# Patient Record
Sex: MALE | Race: WHITE | NOT HISPANIC OR LATINO | Employment: OTHER | URBAN - METROPOLITAN AREA
[De-identification: names, ages, dates, MRNs, and addresses within clinical notes are randomized per-mention and may not be internally consistent; named-entity substitution may affect disease eponyms.]

---

## 2023-10-09 ENCOUNTER — TELEPHONE (OUTPATIENT)
Dept: PRIMARY CARE | Facility: CLINIC | Age: 78
End: 2023-10-09

## 2023-11-09 PROBLEM — B02.22 POST-HERPETIC TRIGEMINAL NEURALGIA: Status: ACTIVE | Noted: 2023-11-09

## 2023-11-09 PROBLEM — E78.5 HYPERLIPIDEMIA: Status: ACTIVE | Noted: 2023-11-09

## 2023-11-09 PROBLEM — E55.9 VITAMIN D DEFICIENCY: Status: ACTIVE | Noted: 2023-11-09

## 2023-11-09 PROBLEM — R93.89 ABNORMAL CT SCAN, CHEST: Status: ACTIVE | Noted: 2023-11-09

## 2023-11-09 PROBLEM — F17.200 CURRENT SMOKER: Status: ACTIVE | Noted: 2023-11-09

## 2023-11-09 PROBLEM — E66.3 OVERWEIGHT (BMI 25.0-29.9): Status: ACTIVE | Noted: 2023-11-09

## 2023-11-09 PROBLEM — I71.40 ANEURYSM OF ABDOMINAL AORTA (CMS-HCC): Status: ACTIVE | Noted: 2023-11-09

## 2023-11-09 PROBLEM — I65.29 CAROTID ARTERY STENOSIS, ASYMPTOMATIC: Status: ACTIVE | Noted: 2023-11-09

## 2023-11-09 PROBLEM — K40.30 INCARCERATED LEFT INGUINAL HERNIA: Status: ACTIVE | Noted: 2023-11-09

## 2023-11-09 PROBLEM — R73.9 HYPERGLYCEMIA: Status: ACTIVE | Noted: 2023-11-09

## 2023-11-09 PROBLEM — R09.89 BRUIT OF LEFT CAROTID ARTERY: Status: ACTIVE | Noted: 2023-11-09

## 2023-11-09 PROBLEM — R53.83 FATIGUE: Status: ACTIVE | Noted: 2023-11-09

## 2023-11-09 PROBLEM — N20.0 LEFT NEPHROLITHIASIS: Status: ACTIVE | Noted: 2023-11-09

## 2023-11-09 PROBLEM — I25.10 CAD (CORONARY ARTERY DISEASE): Status: ACTIVE | Noted: 2023-11-09

## 2023-11-09 PROBLEM — R59.0 HILAR ADENOPATHY: Status: ACTIVE | Noted: 2023-11-09

## 2023-11-09 PROBLEM — N40.0 BPH (BENIGN PROSTATIC HYPERPLASIA): Status: ACTIVE | Noted: 2023-11-09

## 2023-11-09 PROBLEM — I10 HTN (HYPERTENSION): Status: ACTIVE | Noted: 2023-11-09

## 2023-11-09 PROBLEM — K21.00 REFLUX ESOPHAGITIS: Status: ACTIVE | Noted: 2023-11-09

## 2023-11-09 PROBLEM — I49.1 PAC (PREMATURE ATRIAL CONTRACTION): Status: ACTIVE | Noted: 2023-11-09

## 2023-11-09 PROBLEM — I70.1 RIGHT RENAL ARTERY STENOSIS (CMS-HCC): Status: ACTIVE | Noted: 2023-11-09

## 2023-11-09 PROBLEM — K40.90 LEFT INGUINAL HERNIA: Status: ACTIVE | Noted: 2023-11-09

## 2023-11-09 PROBLEM — E79.0 HYPERURICEMIA: Status: ACTIVE | Noted: 2023-11-09

## 2023-11-09 PROBLEM — R93.1 ELEVATED CORONARY ARTERY CALCIUM SCORE: Status: ACTIVE | Noted: 2023-11-09

## 2023-11-09 RX ORDER — METOPROLOL SUCCINATE 25 MG/1
25 TABLET, EXTENDED RELEASE ORAL DAILY
COMMUNITY
Start: 2023-10-14 | End: 2024-03-04 | Stop reason: SDUPTHER

## 2023-11-09 RX ORDER — ALBUTEROL SULFATE 90 UG/1
2 AEROSOL, METERED RESPIRATORY (INHALATION) EVERY 6 HOURS
COMMUNITY
Start: 2022-01-14 | End: 2023-11-10 | Stop reason: ALTCHOICE

## 2023-11-09 RX ORDER — GABAPENTIN 100 MG/1
2 CAPSULE ORAL 3 TIMES DAILY PRN
COMMUNITY
Start: 2023-04-26 | End: 2023-11-10 | Stop reason: ALTCHOICE

## 2023-11-09 RX ORDER — ERGOCALCIFEROL 1.25 MG/1
1 CAPSULE ORAL
COMMUNITY

## 2023-11-09 RX ORDER — PREGABALIN 50 MG/1
50 CAPSULE ORAL 3 TIMES DAILY
COMMUNITY
Start: 2023-09-23

## 2023-11-09 RX ORDER — BENAZEPRIL HYDROCHLORIDE AND HYDROCHLOROTHIAZIDE 10; 12.5 MG/1; MG/1
1 TABLET ORAL DAILY
COMMUNITY
End: 2024-02-14 | Stop reason: SDUPTHER

## 2023-11-09 RX ORDER — ASPIRIN 81 MG/1
1 TABLET ORAL DAILY
COMMUNITY
End: 2023-11-10 | Stop reason: ALTCHOICE

## 2023-11-09 RX ORDER — TAMSULOSIN HYDROCHLORIDE 0.4 MG/1
1 CAPSULE ORAL DAILY
COMMUNITY
End: 2023-11-10 | Stop reason: ALTCHOICE

## 2023-11-09 RX ORDER — ALLOPURINOL 100 MG/1
100 TABLET ORAL DAILY
COMMUNITY

## 2023-11-09 RX ORDER — TRAMADOL HYDROCHLORIDE 50 MG/1
50 TABLET ORAL EVERY 4 HOURS PRN
COMMUNITY
Start: 2023-07-08 | End: 2024-05-15 | Stop reason: ALTCHOICE

## 2023-11-09 RX ORDER — VALACYCLOVIR HYDROCHLORIDE 1 G/1
TABLET, FILM COATED ORAL
COMMUNITY
Start: 2023-04-12 | End: 2023-11-10 | Stop reason: ALTCHOICE

## 2023-11-09 RX ORDER — TRAZODONE HYDROCHLORIDE 50 MG/1
50 TABLET ORAL NIGHTLY PRN
COMMUNITY
Start: 2023-09-30 | End: 2024-05-15 | Stop reason: ALTCHOICE

## 2023-11-10 ENCOUNTER — OFFICE VISIT (OUTPATIENT)
Dept: CARDIOLOGY | Facility: CLINIC | Age: 78
End: 2023-11-10
Payer: MEDICARE

## 2023-11-10 VITALS
HEART RATE: 64 BPM | HEIGHT: 70 IN | TEMPERATURE: 97.9 F | DIASTOLIC BLOOD PRESSURE: 66 MMHG | SYSTOLIC BLOOD PRESSURE: 100 MMHG | WEIGHT: 197 LBS | BODY MASS INDEX: 28.2 KG/M2

## 2023-11-10 DIAGNOSIS — I48.3 TYPICAL ATRIAL FLUTTER (MULTI): Primary | ICD-10-CM

## 2023-11-10 PROCEDURE — 3078F DIAST BP <80 MM HG: CPT | Performed by: INTERNAL MEDICINE

## 2023-11-10 PROCEDURE — 3074F SYST BP LT 130 MM HG: CPT | Performed by: INTERNAL MEDICINE

## 2023-11-10 PROCEDURE — 1126F AMNT PAIN NOTED NONE PRSNT: CPT | Performed by: INTERNAL MEDICINE

## 2023-11-10 PROCEDURE — 99213 OFFICE O/P EST LOW 20 MIN: CPT | Performed by: INTERNAL MEDICINE

## 2023-11-10 RX ORDER — ACETAMINOPHEN 325 MG/1
325 TABLET ORAL EVERY 6 HOURS PRN
COMMUNITY
Start: 2022-11-29 | End: 2024-02-14 | Stop reason: ALTCHOICE

## 2023-11-10 RX ORDER — ESOMEPRAZOLE MAGNESIUM 40 MG/1
40 CAPSULE, DELAYED RELEASE ORAL
COMMUNITY
Start: 2022-11-29 | End: 2024-03-28 | Stop reason: ALTCHOICE

## 2023-11-10 RX ORDER — ACETYLCYSTEINE 600 MG
600 CAPSULE ORAL
COMMUNITY
Start: 2021-02-16 | End: 2024-02-14 | Stop reason: ALTCHOICE

## 2023-11-10 ASSESSMENT — PAIN SCALES - GENERAL: PAINLEVEL: 0-NO PAIN

## 2023-11-10 NOTE — PROGRESS NOTES
Subjective:  The patient is a 78-year-old white male, followed primarily by Dr. Gael Lopez, who presents for follow-up of atrial arrhythmias.  He resides in [place], [place], and presents today with his friend named Andria.  He has been a  for 9 years.    The patient has a history of hypertension and hyperlipidemia, and underwent percutaneous abdominal aortic aneurysm repair in November 2022.  Preoperatively, he had a negative nuclear stress test, showing an LVEF of 62% and no reversible ischemia.  He was noted to have frequent atrial ectopy with a PAC burden of 32.7% by 24-hour Holter monitoring in December 2022.  I saw the patient thereafter, and started him on low-dose beta-blockers for PAC suppression.    The patient was seen at [facility] on 10/25/2023 because of some vague chest heaviness.  His work-up in the ER was negative, but an EKG showed classic atrial flutter with a ventricular response only in the 60s.  The patient admits to generalized weakness and fatigue but no near-syncope or syncope.  He has never had any transient ischemic attacks or strokes.    The patient has been a smoker for the past 60 years and presumed to have some degree of chronic obstructive pulmonary disease.  He also has known mild carotid artery disease by prior ultrasonography.    Current Outpatient Medications   Medication Sig    acetaminophen (Tylenol) 325 mg tablet Take 1 tablet (325 mg) by mouth every 6 hours if needed.    acetylcysteine 600 mg capsule capsule Take 1 capsule (600 mg) by mouth once daily.    esomeprazole (NexIUM) 40 mg DR capsule Take 1 capsule (40 mg) by mouth once daily in the morning. Take before meals.    allopurinol (Zyloprim) 100 mg tablet Take 1 tablet (100 mg) by mouth once daily.    benazepriL-hydrochlorothiazide (Lotensin HCT) 10-12.5 mg tablet Take 1 tablet by mouth once daily.    ergocalciferol (Vitamin D-2) 1.25 MG (63361 UT) capsule Take 1 capsule (1,250 mcg) by mouth 1 (one) time  "per week.    metoprolol succinate XL (Toprol-XL) 25 mg 24 hr tablet Take 1 tablet (25 mg) by mouth once daily.    mv-mn/om3/dha/epa/fish/lut/moise (OCUVITE ADULT 50 PLUS ORAL) Take 1 capsule by mouth 2 times a day. one capsule in the morning and one capsule in the evening    pregabalin (Lyrica) 50 mg capsule Take 1 capsule (50 mg) by mouth 3 times a day.    simvastatin (Zocor) 40 mg tablet TAKE 1 TABLET BY MOUTH AT BEDTIME    traMADol (Ultram) 50 mg tablet Take 1 tablet (50 mg) by mouth every 4 hours if needed for moderate pain (4 - 6).    traZODone (Desyrel) 50 mg tablet Take 1 tablet (50 mg) by mouth as needed at bedtime for sleep.     Allergies:  Patient has no known allergies.     Patient Active Problem List   Diagnosis    Abnormal CT scan, chest    Aneurysm of abdominal aorta (CMS/HCC)    BPH (benign prostatic hyperplasia)    CAD (coronary artery disease)    Elevated coronary artery calcium score    Carotid artery stenosis, asymptomatic    Current smoker    Fatigue    Hilar adenopathy    HTN (hypertension)    Hyperglycemia    Hyperlipidemia    Hyperuricemia    Incarcerated left inguinal hernia    Left inguinal hernia    Left nephrolithiasis    Overweight (BMI 25.0-29.9)    Reflux esophagitis    Right renal artery stenosis (CMS/HCC)    Vitamin D deficiency    Bruit of left carotid artery    PAC (premature atrial contraction)    Post-herpetic trigeminal neuralgia     Past Surgical History:   Procedure Laterality Date    OTHER SURGICAL HISTORY  02/07/2022    Colonoscopy    OTHER SURGICAL HISTORY  09/02/2022    Prostate surgery     Objective:  Vitals:    11/10/23 1044   BP: 100/66   Pulse: 64   Temp: 36.6 °C (97.9 °F)   Height:     1.778 m (5' 10\")  Weight: 89.4 kg (197 lb)     Exam:  Gen: Bearded gentleman in no distress, accompanied by his friend Andria.  HEENT: No scleral icterus; mild rhinophyma with facial rubor.  Neck: No jugular venous distention or thyromegaly.  Lungs: Diminished breath sounds throughout, " but no wheezes or rales  Heart: Irregular rhythm with variable S1 but no murmurs.  Abdomen: Benign, with no organomegaly or masses.  Extremities: Intact distal pulses; trace bilateral ankle edema.  Neuro: No focal neurologic abnormalities.  Skin: No cutaneous lesions.    Impressions:  1.  Chronic hypertension, under good control.  2.  Longstanding tobacco use with presumed chronic obstructive pulmonary disease.  3.  Atherosclerotic vascular disease, status post abdominal aortic aneurysm repair in November 2022.  4.  Atrial arrhythmias, with prior frequent premature atrial complexes but now in atrial flutter of uncertain duration.  The etiology of this is likely the patient's lung disease.  The patient appears to have good rate control low-dose beta-blockers.  He has a [id number] score of at least 4 (hypertension, vascular disease, and 2 points for age over 75), and full anticoagulation is warranted.  5.  Hyperlipidemia, on statin therapy.    Recommendations:  1.  The patient will be started on Eliquis anticoagulation of 5 mg p.o. twice daily.  2.  He will undergo a direct-current cardioversion at [facility] after 1 month of anticoagulant therapy.  3.  With recurrence of classic atrial flutter thereafter, he would be an excellent candidate for cavotricuspid isthmus ablation, something I discussed with today with him and his friend Andria.      Nikunj Rios MD

## 2023-11-15 ENCOUNTER — TELEPHONE (OUTPATIENT)
Dept: CARDIOLOGY | Facility: CLINIC | Age: 78
End: 2023-11-15
Payer: MEDICARE

## 2023-11-15 NOTE — TELEPHONE ENCOUNTER
Patient is trying to scheduled cardio version at [facility] if possible. Saw Mosteller in office Friday. Please call [phone]

## 2023-12-01 ENCOUNTER — APPOINTMENT (OUTPATIENT)
Dept: CARDIOLOGY | Facility: HOSPITAL | Age: 78
End: 2023-12-01
Payer: MEDICARE

## 2023-12-07 LAB
NON-UH HIE BASO COUNT: 0.05 X1000 (ref 0–0.2)
NON-UH HIE BASOS %: 1 %
NON-UH HIE BUN/CREAT RATIO: 15
NON-UH HIE BUN: 18 MG/DL (ref 9–23)
NON-UH HIE CALCIUM: 9.1 MG/DL (ref 8.7–10.4)
NON-UH HIE CALCULATED OSMOLALITY: 282 MOSM/KG (ref 275–295)
NON-UH HIE CHLORIDE: 106 MMOL/L (ref 98–107)
NON-UH HIE CO2, VENOUS: 30 MMOL/L (ref 20–31)
NON-UH HIE CREATININE: 1.2 MG/DL (ref 0.6–1.1)
NON-UH HIE DIFF?: NO
NON-UH HIE EOS COUNT: 0.17 X1000 (ref 0–0.5)
NON-UH HIE EOSIN %: 3.4 %
NON-UH HIE GFR AA: >60
NON-UH HIE GLOMERULAR FILTRATION RATE: 58 ML/MIN/1.73M?
NON-UH HIE GLUCOSE: 110 MG/DL (ref 74–106)
NON-UH HIE HCT: 40.2 % (ref 41–52)
NON-UH HIE HGB: 13.8 G/DL (ref 13.5–17.5)
NON-UH HIE INSTR WBC: 5
NON-UH HIE K: 4.4 MMOL/L (ref 3.5–5.1)
NON-UH HIE LYMPH %: 20.4 %
NON-UH HIE LYMPH COUNT: 1.02 X1000 (ref 1.2–4.8)
NON-UH HIE MCH: 33.2 PG (ref 27–34)
NON-UH HIE MCHC: 34.3 G/DL (ref 32–37)
NON-UH HIE MCV: 96.7 FL (ref 80–100)
NON-UH HIE MONO %: 10.6 %
NON-UH HIE MONO COUNT: 0.53 X1000 (ref 0.1–1)
NON-UH HIE MPV: 8.1 FL (ref 7.4–10.4)
NON-UH HIE NA: 140 MMOL/L (ref 135–145)
NON-UH HIE NEUTROPHIL %: 64.6 %
NON-UH HIE NEUTROPHIL COUNT (ANC): 3.22 X1000 (ref 1.4–8.8)
NON-UH HIE NUCLEATED RBC: 0 /100WBC
NON-UH HIE PLATELET: 160 X10 (ref 150–450)
NON-UH HIE RBC: 4.16 X10 (ref 4.7–6.1)
NON-UH HIE RDW: 13.9 % (ref 11.5–14.5)
NON-UH HIE WBC: 5 X10 (ref 4.5–11)

## 2023-12-27 ENCOUNTER — OFFICE VISIT (OUTPATIENT)
Dept: CARDIOLOGY | Facility: CLINIC | Age: 78
End: 2023-12-27
Payer: MEDICARE

## 2023-12-27 VITALS
WEIGHT: 198 LBS | HEART RATE: 72 BPM | SYSTOLIC BLOOD PRESSURE: 118 MMHG | DIASTOLIC BLOOD PRESSURE: 74 MMHG | HEIGHT: 70 IN | BODY MASS INDEX: 28.35 KG/M2

## 2023-12-27 DIAGNOSIS — I71.43 INFRARENAL ABDOMINAL AORTIC ANEURYSM (AAA) WITHOUT RUPTURE (CMS-HCC): Primary | ICD-10-CM

## 2023-12-27 DIAGNOSIS — I48.3 TYPICAL ATRIAL FLUTTER (MULTI): ICD-10-CM

## 2023-12-27 DIAGNOSIS — I10 PRIMARY HYPERTENSION: ICD-10-CM

## 2023-12-27 PROCEDURE — 93000 ELECTROCARDIOGRAM COMPLETE: CPT | Performed by: INTERNAL MEDICINE

## 2023-12-27 PROCEDURE — 1159F MED LIST DOCD IN RCRD: CPT | Performed by: INTERNAL MEDICINE

## 2023-12-27 PROCEDURE — 3074F SYST BP LT 130 MM HG: CPT | Performed by: INTERNAL MEDICINE

## 2023-12-27 PROCEDURE — 3078F DIAST BP <80 MM HG: CPT | Performed by: INTERNAL MEDICINE

## 2023-12-27 PROCEDURE — 1126F AMNT PAIN NOTED NONE PRSNT: CPT | Performed by: INTERNAL MEDICINE

## 2023-12-27 PROCEDURE — 99214 OFFICE O/P EST MOD 30 MIN: CPT | Performed by: INTERNAL MEDICINE

## 2023-12-27 NOTE — PROGRESS NOTES
1. CAD, high calcium score  2. Active smoker, emphysema  3. Hypertension  4. Hyperlipidemia  5. AAA percutaneous intravascular stenting 11/2022  6. Overweight mild    Patient is a pleasant 78-year-old male with the above-noted pertinent past medical history who presents today for follow-up.  November 10, 2023 he underwent elective cardioversion for atrial flutter, he presents today stating that he is level of energy has improved his wife is present at the visit time who concurs with the fact that he has more energy,, patient has no complaints of palpitation he has been compliant with his medication, he continues to be an active smoker yet he states that he has been able to cut back some he denies orthopnea PND or lower extremity edema.    Vital signs reviewed and stable BMI of 28.4  Patient is a well-developed well-nourished male in no acute distress speaking full sentences no carotid bruits upstroke and volumes and central venous pressures are normal, heart rate and rhythm are regular S1-S2 are distant sound but normal intensity no gallop or murmurs appreciated lungs severely decreased breath sound but clear abdomen is moderately distended positive bowel sounds soft and nontender    Echocardiography November 23 showed that his LV systolic function is normal at 60+/- 5% ascending aorta mildly aneurysmal at 4.1 cm  EKG today shows sinus bradycardia with first-degree AV block GA interval 556 ms otherwise normal active problems    Clinical atrial flutter with elective cardioversion after completing 3 weeks of anticoagulation patient was recommended to continue with his Eliquis at this time, I had received a phone call from Dr. Rios who had stated that after receiving sedating medication for his cardioversion patient has a very loud snoring recommend patient to undergo home sleep study  Heart healthy diet and weight loss was recommended  Smoking cessation was extensively discussed recommendation for complete  cessation was made  Patient is to return to my clinic in 3 months for reevaluation

## 2024-02-07 PROBLEM — Z95.828 HISTORY OF ENDOVASCULAR STENT GRAFT FOR ABDOMINAL AORTIC ANEURYSM (AAA): Status: ACTIVE | Noted: 2023-07-12

## 2024-02-07 PROBLEM — K44.9 HIATAL HERNIA: Status: ACTIVE | Noted: 2024-02-07

## 2024-02-07 PROBLEM — I48.92 ATRIAL FLUTTER (MULTI): Status: ACTIVE | Noted: 2023-12-07

## 2024-02-14 ENCOUNTER — OFFICE VISIT (OUTPATIENT)
Dept: PRIMARY CARE | Facility: CLINIC | Age: 79
End: 2024-02-14
Payer: MEDICARE

## 2024-02-14 VITALS
OXYGEN SATURATION: 93 % | BODY MASS INDEX: 28.2 KG/M2 | WEIGHT: 197 LBS | SYSTOLIC BLOOD PRESSURE: 130 MMHG | HEIGHT: 70 IN | HEART RATE: 58 BPM | TEMPERATURE: 97.7 F | DIASTOLIC BLOOD PRESSURE: 90 MMHG

## 2024-02-14 DIAGNOSIS — F17.200 ENCOUNTER FOR SCREENING FOR MALIGNANT NEOPLASM OF LUNG IN CURRENT SMOKER WITH 30 PACK YEAR HISTORY OR GREATER: ICD-10-CM

## 2024-02-14 DIAGNOSIS — E78.2 MIXED HYPERLIPIDEMIA: ICD-10-CM

## 2024-02-14 DIAGNOSIS — R73.9 HYPERGLYCEMIA: ICD-10-CM

## 2024-02-14 DIAGNOSIS — I25.10 CORONARY ARTERY DISEASE INVOLVING NATIVE CORONARY ARTERY OF NATIVE HEART WITHOUT ANGINA PECTORIS: ICD-10-CM

## 2024-02-14 DIAGNOSIS — E55.9 VITAMIN D DEFICIENCY: ICD-10-CM

## 2024-02-14 DIAGNOSIS — Z53.20 LUNG CANCER SCREENING DECLINED BY PATIENT: ICD-10-CM

## 2024-02-14 DIAGNOSIS — I10 PRIMARY HYPERTENSION: ICD-10-CM

## 2024-02-14 DIAGNOSIS — F17.200 CURRENT SMOKER: ICD-10-CM

## 2024-02-14 DIAGNOSIS — Z00.00 WELLNESS EXAMINATION: Primary | ICD-10-CM

## 2024-02-14 DIAGNOSIS — N28.9 ABNORMAL RENAL FUNCTION: ICD-10-CM

## 2024-02-14 DIAGNOSIS — Z12.2 ENCOUNTER FOR SCREENING FOR MALIGNANT NEOPLASM OF LUNG IN CURRENT SMOKER WITH 30 PACK YEAR HISTORY OR GREATER: ICD-10-CM

## 2024-02-14 DIAGNOSIS — Z86.79 HISTORY OF ATRIAL FLUTTER: ICD-10-CM

## 2024-02-14 DIAGNOSIS — E78.5 HYPERLIPIDEMIA, UNSPECIFIED: ICD-10-CM

## 2024-02-14 PROCEDURE — 3080F DIAST BP >= 90 MM HG: CPT | Performed by: INTERNAL MEDICINE

## 2024-02-14 PROCEDURE — 3075F SYST BP GE 130 - 139MM HG: CPT | Performed by: INTERNAL MEDICINE

## 2024-02-14 PROCEDURE — 99397 PER PM REEVAL EST PAT 65+ YR: CPT | Performed by: INTERNAL MEDICINE

## 2024-02-14 PROCEDURE — 1126F AMNT PAIN NOTED NONE PRSNT: CPT | Performed by: INTERNAL MEDICINE

## 2024-02-14 PROCEDURE — 1170F FXNL STATUS ASSESSED: CPT | Performed by: INTERNAL MEDICINE

## 2024-02-14 RX ORDER — SIMVASTATIN 40 MG/1
40 TABLET, FILM COATED ORAL NIGHTLY
Qty: 90 TABLET | Refills: 1 | Status: SHIPPED | OUTPATIENT
Start: 2024-02-14

## 2024-02-14 RX ORDER — BENAZEPRIL HYDROCHLORIDE AND HYDROCHLOROTHIAZIDE 10; 12.5 MG/1; MG/1
1 TABLET ORAL DAILY
Qty: 90 TABLET | Refills: 1 | Status: SHIPPED | OUTPATIENT
Start: 2024-02-14

## 2024-02-14 ASSESSMENT — ACTIVITIES OF DAILY LIVING (ADL)
MANAGING_FINANCES: INDEPENDENT
DRESSING: INDEPENDENT
GROCERY_SHOPPING: INDEPENDENT
DOING_HOUSEWORK: INDEPENDENT
TAKING_MEDICATION: INDEPENDENT
BATHING: INDEPENDENT

## 2024-02-14 ASSESSMENT — PATIENT HEALTH QUESTIONNAIRE - PHQ9
SUM OF ALL RESPONSES TO PHQ9 QUESTIONS 1 AND 2: 0
1. LITTLE INTEREST OR PLEASURE IN DOING THINGS: NOT AT ALL
2. FEELING DOWN, DEPRESSED OR HOPELESS: NOT AT ALL

## 2024-02-14 ASSESSMENT — ENCOUNTER SYMPTOMS
LOSS OF SENSATION IN FEET: 0
DEPRESSION: 0
OCCASIONAL FEELINGS OF UNSTEADINESS: 0

## 2024-02-14 NOTE — PROGRESS NOTES
Patient is seen office today for wellness examination and follow-up of his medical problems including hypertension, hyperlipidemia, hyperglycemia, hyperuricemia and other medical problems.  He is being followed by his cardiologist for history of atrial flutter.  He denies any chest pain or palpitation.  Has no cough expectoration.  Denies any nausea matting pain abdomen.  He has no urinary symptoms.  Denies any weakness of any extremity.  Denies any joint swelling or pain he did not have any attack of gout recently.  He denies any polyuria, polydipsia any other symptom uncontrolled hyperglycemia.  Unfortunately continues to smoke a pack a day.  He is still declines any screening for the lung cancer.    On examination:  General examination: Overweight.  BMI 28.2  Eyes: There is no pallor or jaundice.  Pupils are equal and reactive to light  Oral cavity throat normal  Neck: There is no carotid bruit or JVD  Lungs: Clear to auscultation  CVS: Heart sounds are regular.  There is no gallop or murmur  Abdomen: Normal exam  CNS: Power is normal  Musculoskeletal system there is no joint swelling or deformity  Legs: No edema    Assessment and plan  1.  History of smoking more than 30-pack-year: Smoking cessation discussed with the patient.  Patient does not want any cancer screening to be done.  Different treatment options are also discussed with the patient  2.  Hypertension: Controlled today's borderline high but generally under good control.  His prescription for benazepril and hydrochlorothiazide is renewed  3.  Hyperlipidemia: I will refill the prescription for simvastatin.  Repeat lipid panel is ordered  4.  Hyperglycemia: Advised to continue to avoid sugar and sweets in the diet repeat hemoglobin A1c level is ordered  5.  Hyperuricemia continued on allopurinol.  Uric acid level is ordered  6.  History of atrial flutter: Patient seems to be in sinus rhythm at this time.  He is on Eliquis he is being followed by his  cardiologist  7.  Gastroesophageal reflux disease: Symptoms are controlled on Nexium  8.  Health maintenance: He had a colonoscopy done in 2020.  He is goes to urologist Dr. Peguero for prostate checkup.  He already had a flu vaccination.  He does not want an updated COVID-vaccine.  He will be seen in office in 4 months.

## 2024-02-17 ENCOUNTER — LAB (OUTPATIENT)
Dept: LAB | Facility: LAB | Age: 79
End: 2024-02-17
Payer: MEDICARE

## 2024-02-17 DIAGNOSIS — N28.9 ABNORMAL RENAL FUNCTION: ICD-10-CM

## 2024-02-17 DIAGNOSIS — R73.9 HYPERGLYCEMIA: ICD-10-CM

## 2024-02-17 DIAGNOSIS — I10 PRIMARY HYPERTENSION: ICD-10-CM

## 2024-02-17 DIAGNOSIS — E78.2 MIXED HYPERLIPIDEMIA: ICD-10-CM

## 2024-02-17 DIAGNOSIS — Z86.79 HISTORY OF ATRIAL FLUTTER: ICD-10-CM

## 2024-02-17 PROCEDURE — 36415 COLL VENOUS BLD VENIPUNCTURE: CPT

## 2024-02-17 PROCEDURE — 83036 HEMOGLOBIN GLYCOSYLATED A1C: CPT

## 2024-02-17 PROCEDURE — 84550 ASSAY OF BLOOD/URIC ACID: CPT

## 2024-02-17 PROCEDURE — 80053 COMPREHEN METABOLIC PANEL: CPT

## 2024-02-17 PROCEDURE — 80061 LIPID PANEL: CPT

## 2024-02-17 PROCEDURE — 85025 COMPLETE CBC W/AUTO DIFF WBC: CPT

## 2024-02-18 LAB
ALBUMIN SERPL BCP-MCNC: 4.3 G/DL (ref 3.4–5)
ALP SERPL-CCNC: 60 U/L (ref 33–136)
ALT SERPL W P-5'-P-CCNC: 12 U/L (ref 10–52)
ANION GAP SERPL CALC-SCNC: 13 MMOL/L (ref 10–20)
AST SERPL W P-5'-P-CCNC: 14 U/L (ref 9–39)
BASOPHILS # BLD AUTO: 0.04 X10*3/UL (ref 0–0.1)
BASOPHILS NFR BLD AUTO: 0.5 %
BILIRUB SERPL-MCNC: 0.7 MG/DL (ref 0–1.2)
BUN SERPL-MCNC: 16 MG/DL (ref 6–23)
CALCIUM SERPL-MCNC: 9.4 MG/DL (ref 8.6–10.6)
CHLORIDE SERPL-SCNC: 104 MMOL/L (ref 98–107)
CHOLEST SERPL-MCNC: 150 MG/DL (ref 0–199)
CHOLESTEROL/HDL RATIO: 2.7
CO2 SERPL-SCNC: 28 MMOL/L (ref 21–32)
CREAT SERPL-MCNC: 1.12 MG/DL (ref 0.5–1.3)
EGFRCR SERPLBLD CKD-EPI 2021: 67 ML/MIN/1.73M*2
EOSINOPHIL # BLD AUTO: 0.15 X10*3/UL (ref 0–0.4)
EOSINOPHIL NFR BLD AUTO: 1.9 %
ERYTHROCYTE [DISTWIDTH] IN BLOOD BY AUTOMATED COUNT: 13.2 % (ref 11.5–14.5)
EST. AVERAGE GLUCOSE BLD GHB EST-MCNC: 123 MG/DL
GLUCOSE SERPL-MCNC: 100 MG/DL (ref 74–99)
HBA1C MFR BLD: 5.9 %
HCT VFR BLD AUTO: 43.5 % (ref 41–52)
HDLC SERPL-MCNC: 55.2 MG/DL
HGB BLD-MCNC: 13.9 G/DL (ref 13.5–17.5)
IMM GRANULOCYTES # BLD AUTO: 0.02 X10*3/UL (ref 0–0.5)
IMM GRANULOCYTES NFR BLD AUTO: 0.3 % (ref 0–0.9)
LDLC SERPL CALC-MCNC: 77 MG/DL
LYMPHOCYTES # BLD AUTO: 0.99 X10*3/UL (ref 0.8–3)
LYMPHOCYTES NFR BLD AUTO: 12.5 %
MCH RBC QN AUTO: 32 PG (ref 26–34)
MCHC RBC AUTO-ENTMCNC: 32 G/DL (ref 32–36)
MCV RBC AUTO: 100 FL (ref 80–100)
MONOCYTES # BLD AUTO: 0.58 X10*3/UL (ref 0.05–0.8)
MONOCYTES NFR BLD AUTO: 7.3 %
NEUTROPHILS # BLD AUTO: 6.16 X10*3/UL (ref 1.6–5.5)
NEUTROPHILS NFR BLD AUTO: 77.5 %
NON HDL CHOLESTEROL: 95 MG/DL (ref 0–149)
NRBC BLD-RTO: 0 /100 WBCS (ref 0–0)
PLATELET # BLD AUTO: 196 X10*3/UL (ref 150–450)
POTASSIUM SERPL-SCNC: 4.4 MMOL/L (ref 3.5–5.3)
PROT SERPL-MCNC: 6.4 G/DL (ref 6.4–8.2)
RBC # BLD AUTO: 4.34 X10*6/UL (ref 4.5–5.9)
SODIUM SERPL-SCNC: 141 MMOL/L (ref 136–145)
TRIGL SERPL-MCNC: 90 MG/DL (ref 0–149)
URATE SERPL-MCNC: 4.9 MG/DL (ref 4–7.5)
VLDL: 18 MG/DL (ref 0–40)
WBC # BLD AUTO: 7.9 X10*3/UL (ref 4.4–11.3)

## 2024-02-19 ENCOUNTER — TELEPHONE (OUTPATIENT)
Dept: PRIMARY CARE | Facility: CLINIC | Age: 79
End: 2024-02-19
Payer: MEDICARE

## 2024-02-19 DIAGNOSIS — N17.9 AKI (ACUTE KIDNEY INJURY) (CMS-HCC): Primary | ICD-10-CM

## 2024-02-19 NOTE — TELEPHONE ENCOUNTER
Pt is aware of results.     ----- Message from Tony Lopez MD sent at 2/19/2024 10:48 AM EST -----  Abnormal renal function.  Repeat BMP in 6 to 8-week.  Advised the patient to drink plenty of water keep well-hydrated.  Other results are okay  ----- Message -----  From: Lab, Background User  Sent: 2/18/2024   3:51 AM EST  To: Toyn Lopze MD

## 2024-03-04 DIAGNOSIS — I49.1 PAC (PREMATURE ATRIAL CONTRACTION): ICD-10-CM

## 2024-03-05 RX ORDER — METOPROLOL SUCCINATE 25 MG/1
25 TABLET, EXTENDED RELEASE ORAL DAILY
Qty: 90 TABLET | Refills: 2 | Status: SHIPPED | OUTPATIENT
Start: 2024-03-05

## 2024-03-07 NOTE — TELEPHONE ENCOUNTER
Andria called and was questioning why the Metoprolol is taking so long to order. Would like to speak with a manager. Per chart, medication was sent 3/5 to [facility]

## 2024-03-28 ENCOUNTER — APPOINTMENT (OUTPATIENT)
Dept: PRIMARY CARE | Facility: CLINIC | Age: 79
End: 2024-03-28
Payer: MEDICARE

## 2024-03-28 ENCOUNTER — OFFICE VISIT (OUTPATIENT)
Dept: CARDIOLOGY | Facility: CLINIC | Age: 79
End: 2024-03-28
Payer: MEDICARE

## 2024-03-28 ENCOUNTER — APPOINTMENT (OUTPATIENT)
Dept: CARDIOLOGY | Facility: CLINIC | Age: 79
End: 2024-03-28
Payer: MEDICARE

## 2024-03-28 VITALS
SYSTOLIC BLOOD PRESSURE: 124 MMHG | WEIGHT: 196 LBS | DIASTOLIC BLOOD PRESSURE: 76 MMHG | BODY MASS INDEX: 27.44 KG/M2 | HEIGHT: 71 IN | TEMPERATURE: 96.4 F | HEART RATE: 60 BPM

## 2024-03-28 DIAGNOSIS — R93.1 ELEVATED CORONARY ARTERY CALCIUM SCORE: ICD-10-CM

## 2024-03-28 DIAGNOSIS — I10 PRIMARY HYPERTENSION: ICD-10-CM

## 2024-03-28 DIAGNOSIS — E78.2 MIXED HYPERLIPIDEMIA: ICD-10-CM

## 2024-03-28 DIAGNOSIS — I71.43 INFRARENAL ABDOMINAL AORTIC ANEURYSM (AAA) WITHOUT RUPTURE (CMS-HCC): ICD-10-CM

## 2024-03-28 DIAGNOSIS — I25.10 CORONARY ARTERY DISEASE INVOLVING NATIVE CORONARY ARTERY OF NATIVE HEART WITHOUT ANGINA PECTORIS: Primary | ICD-10-CM

## 2024-03-28 PROCEDURE — 99214 OFFICE O/P EST MOD 30 MIN: CPT | Performed by: INTERNAL MEDICINE

## 2024-03-28 PROCEDURE — 3074F SYST BP LT 130 MM HG: CPT | Performed by: INTERNAL MEDICINE

## 2024-03-28 PROCEDURE — 1126F AMNT PAIN NOTED NONE PRSNT: CPT | Performed by: INTERNAL MEDICINE

## 2024-03-28 PROCEDURE — 3078F DIAST BP <80 MM HG: CPT | Performed by: INTERNAL MEDICINE

## 2024-03-28 RX ORDER — LUTEIN 10 MG
10 TABLET ORAL
COMMUNITY
Start: 2018-08-16

## 2024-03-28 RX ORDER — FINASTERIDE 5 MG/1
5 TABLET, FILM COATED ORAL DAILY
COMMUNITY
Start: 2018-08-16 | End: 2024-05-15 | Stop reason: ALTCHOICE

## 2024-03-28 ASSESSMENT — PAIN SCALES - GENERAL: PAINLEVEL: 0-NO PAIN

## 2024-03-28 NOTE — PROGRESS NOTES
1. CAD, high calcium score  2. Active smoker, emphysema  3. Hypertension  4. Hyperlipidemia  5. AAA percutaneous intravascular stenting 11/2022  6. Overweight mild    Patient is a pleasant 78-year-old male with the above noted pertinent past medical history who presents today for follow-up.  He has no complaints of exertional chest pain or shortness of breath, he has no scheduled exercises yet remains active like chores around the house, he continues to be in a active smoker of 1 pack/day, and he says he is not been compliant with his diet and he states that he enjoys his sweets with daily consumption cookies cakes and.  He has no complaints of orthopnea PND palpitation or syncopal episode    BMI is elevated at 27.3, blood pressure 124/76 mmHg and a heart rate of 60 bpm patient is a well-developed well-nourished pleasant gentleman no acute distress speaking full sentences no carotid bruits upstroke and volumes are normal heart rate and rhythm are regular S1-S2 are normal no gallop or murmurs, lungs are clear to auscultation abdomen is obese positive bowel sounds soft and nontender    February 2024  Total cholesterol 150, triglyceride 90, HDL 55, and LDL of 77  Sodium 141, potassium 4.4, BUN 16, creatinine 1.12  A1c 5.9%    Coronary artery disease with coronary artery calcification as evident by CT examination the importance of risk factor modification was discussed continuation of current regimen was recommended  Atrial fibrillation paroxysmal on rate control anticoagulation  Hypertension under good control  Hyperlipidemia his latest cholesterol profile was reviewed and discussed with him  After the smoking complete smoking cessation was discussed and recommended  Noncompliant with dietary restrictions decreasing carbohydrate use cookies cakes and pies was discussed  Return to my clinic in 6 months.

## 2024-05-13 ENCOUNTER — PATIENT OUTREACH (OUTPATIENT)
Dept: PRIMARY CARE | Facility: CLINIC | Age: 79
End: 2024-05-13
Payer: MEDICARE

## 2024-05-14 PROBLEM — R51.9 HEADACHE: Status: ACTIVE | Noted: 2024-05-14

## 2024-05-14 PROBLEM — R05.9 COUGH: Status: ACTIVE | Noted: 2024-05-14

## 2024-05-14 PROBLEM — R29.898 LEG WEAKNESS: Status: ACTIVE | Noted: 2024-04-23

## 2024-05-14 PROBLEM — I25.10 ARTERIOSCLEROSIS OF CORONARY ARTERY: Status: ACTIVE | Noted: 2023-11-09

## 2024-05-14 PROBLEM — I71.40 ABDOMINAL AORTIC ANEURYSM (AAA) WITHOUT RUPTURE (CMS-HCC): Status: ACTIVE | Noted: 2018-12-26
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
## 2024-05-14 PROBLEM — L72.3 SEBACEOUS CYST: Status: ACTIVE | Noted: 2024-05-14

## 2024-05-14 PROBLEM — L98.499: Status: ACTIVE | Noted: 2024-05-14

## 2024-05-14 PROBLEM — M79.2 NEUROPATHIC PAIN: Status: ACTIVE | Noted: 2024-05-14

## 2024-05-14 PROBLEM — I65.29 STENOSIS OF CAROTID ARTERY: Status: ACTIVE | Noted: 2018-01-03

## 2024-05-14 PROBLEM — L20.9 ATOPIC DERMATITIS: Status: ACTIVE | Noted: 2024-05-14

## 2024-05-14 PROBLEM — Z86.39 HISTORY OF ELEVATED LIPIDS: Status: ACTIVE | Noted: 2024-05-14

## 2024-05-14 PROBLEM — B02.9 HERPES ZOSTER: Status: ACTIVE | Noted: 2023-11-09

## 2024-05-14 PROBLEM — L29.9 PRURITIC DISORDER: Status: ACTIVE | Noted: 2024-05-14

## 2024-05-14 PROBLEM — L29.8 WINTER ITCH: Status: ACTIVE | Noted: 2024-05-14

## 2024-05-14 PROBLEM — R01.1 HEART MURMUR: Status: ACTIVE | Noted: 2024-05-14

## 2024-05-14 PROBLEM — I25.84 CALCIFICATION OF CORONARY ARTERY: Status: ACTIVE | Noted: 2023-11-09

## 2024-05-14 PROBLEM — T14.8XXA OPEN WOUND WITH COMPLICATION: Status: ACTIVE | Noted: 2024-05-14

## 2024-05-14 PROBLEM — H91.90 HEARING LOSS: Status: ACTIVE | Noted: 2024-05-14

## 2024-05-14 PROBLEM — Z86.79 HISTORY OF AORTIC ANEURYSM: Status: ACTIVE | Noted: 2024-05-14

## 2024-05-14 PROBLEM — G47.00 INSOMNIA: Status: ACTIVE | Noted: 2024-05-14

## 2024-05-14 PROBLEM — M75.20 BICEPS TENDINITIS: Status: ACTIVE | Noted: 2024-05-14

## 2024-05-14 PROBLEM — M79.601 PAIN OF RIGHT UPPER EXTREMITY: Status: ACTIVE | Noted: 2024-05-14

## 2024-05-14 PROBLEM — E78.00 HYPERCHOLESTEROLEMIA: Status: ACTIVE | Noted: 2023-11-09

## 2024-05-14 PROBLEM — I25.10 CALCIFICATION OF CORONARY ARTERY: Status: ACTIVE | Noted: 2023-11-09

## 2024-05-14 PROBLEM — Z86.79 HISTORY OF HYPERTENSION: Status: ACTIVE | Noted: 2024-05-14

## 2024-05-14 PROBLEM — L73.8 SEBACEOUS GLAND HYPERPLASIA: Status: ACTIVE | Noted: 2024-05-14

## 2024-05-14 PROBLEM — L29.89 WINTER ITCH: Status: ACTIVE | Noted: 2024-05-14

## 2024-05-14 PROBLEM — R59.0 HILAR LYMPHADENOPATHY: Status: ACTIVE | Noted: 2023-11-09

## 2024-05-14 PROBLEM — L98.9 SKIN LESION: Status: ACTIVE | Noted: 2024-05-14

## 2024-05-14 PROBLEM — J40 BRONCHITIS: Status: ACTIVE | Noted: 2024-05-14

## 2024-05-14 PROBLEM — H66.90 OTITIS MEDIA: Status: ACTIVE | Noted: 2024-05-14

## 2024-05-14 PROBLEM — J18.9 COMMUNITY ACQUIRED PNEUMONIA, BILATERAL: Status: ACTIVE | Noted: 2024-05-09

## 2024-05-15 ENCOUNTER — OFFICE VISIT (OUTPATIENT)
Dept: PRIMARY CARE | Facility: CLINIC | Age: 79
End: 2024-05-15
Payer: MEDICARE

## 2024-05-15 ENCOUNTER — OFFICE VISIT (OUTPATIENT)
Dept: CARDIOLOGY | Facility: CLINIC | Age: 79
End: 2024-05-15
Payer: MEDICARE

## 2024-05-15 VITALS
HEART RATE: 58 BPM | WEIGHT: 202 LBS | SYSTOLIC BLOOD PRESSURE: 130 MMHG | DIASTOLIC BLOOD PRESSURE: 76 MMHG | BODY MASS INDEX: 28.28 KG/M2 | TEMPERATURE: 96.5 F | HEIGHT: 71 IN

## 2024-05-15 VITALS
HEIGHT: 70 IN | BODY MASS INDEX: 28.92 KG/M2 | DIASTOLIC BLOOD PRESSURE: 60 MMHG | OXYGEN SATURATION: 98 % | SYSTOLIC BLOOD PRESSURE: 118 MMHG | HEART RATE: 53 BPM | WEIGHT: 202 LBS

## 2024-05-15 DIAGNOSIS — Z09 HOSPITAL DISCHARGE FOLLOW-UP: Primary | ICD-10-CM

## 2024-05-15 DIAGNOSIS — J42 CHRONIC BRONCHITIS, UNSPECIFIED CHRONIC BRONCHITIS TYPE (MULTI): ICD-10-CM

## 2024-05-15 DIAGNOSIS — J18.9 COMMUNITY ACQUIRED PNEUMONIA, BILATERAL: ICD-10-CM

## 2024-05-15 DIAGNOSIS — E78.2 MIXED HYPERLIPIDEMIA: ICD-10-CM

## 2024-05-15 DIAGNOSIS — I25.10 CORONARY ARTERY DISEASE INVOLVING NATIVE CORONARY ARTERY OF NATIVE HEART WITHOUT ANGINA PECTORIS: ICD-10-CM

## 2024-05-15 DIAGNOSIS — I10 PRIMARY HYPERTENSION: ICD-10-CM

## 2024-05-15 DIAGNOSIS — E78.00 HYPERCHOLESTEROLEMIA: ICD-10-CM

## 2024-05-15 DIAGNOSIS — E55.9 VITAMIN D DEFICIENCY: ICD-10-CM

## 2024-05-15 DIAGNOSIS — F17.200 CURRENT SMOKER: ICD-10-CM

## 2024-05-15 DIAGNOSIS — R01.1 HEART MURMUR: ICD-10-CM

## 2024-05-15 DIAGNOSIS — I25.10 CORONARY ARTERY DISEASE INVOLVING NATIVE CORONARY ARTERY OF NATIVE HEART WITHOUT ANGINA PECTORIS: Primary | ICD-10-CM

## 2024-05-15 DIAGNOSIS — J96.01 ACUTE RESPIRATORY FAILURE WITH HYPOXIA (MULTI): ICD-10-CM

## 2024-05-15 DIAGNOSIS — R73.9 HYPERGLYCEMIA: ICD-10-CM

## 2024-05-15 PROCEDURE — 99214 OFFICE O/P EST MOD 30 MIN: CPT | Performed by: INTERNAL MEDICINE

## 2024-05-15 PROCEDURE — 3075F SYST BP GE 130 - 139MM HG: CPT | Performed by: INTERNAL MEDICINE

## 2024-05-15 PROCEDURE — 1159F MED LIST DOCD IN RCRD: CPT | Performed by: INTERNAL MEDICINE

## 2024-05-15 PROCEDURE — 3078F DIAST BP <80 MM HG: CPT | Performed by: INTERNAL MEDICINE

## 2024-05-15 PROCEDURE — 3074F SYST BP LT 130 MM HG: CPT | Performed by: INTERNAL MEDICINE

## 2024-05-15 PROCEDURE — 1126F AMNT PAIN NOTED NONE PRSNT: CPT | Performed by: INTERNAL MEDICINE

## 2024-05-15 PROCEDURE — 99496 TRANSJ CARE MGMT HIGH F2F 7D: CPT | Performed by: INTERNAL MEDICINE

## 2024-05-15 RX ORDER — DOXYCYCLINE 100 MG/1
100 CAPSULE ORAL 2 TIMES DAILY
COMMUNITY
Start: 2024-05-10 | End: 2024-05-17

## 2024-05-15 RX ORDER — ALBUTEROL SULFATE 90 UG/1
2 AEROSOL, METERED RESPIRATORY (INHALATION) EVERY 4 HOURS PRN
COMMUNITY
Start: 2024-05-10 | End: 2024-06-09

## 2024-05-15 RX ORDER — PREDNISONE 20 MG/1
20 TABLET ORAL
COMMUNITY
Start: 2024-05-10 | End: 2024-05-22

## 2024-05-15 ASSESSMENT — ENCOUNTER SYMPTOMS
LOSS OF SENSATION IN FEET: 0
DEPRESSION: 0
OCCASIONAL FEELINGS OF UNSTEADINESS: 0

## 2024-05-15 ASSESSMENT — PAIN SCALES - GENERAL: PAINLEVEL: 0-NO PAIN

## 2024-05-15 NOTE — PROGRESS NOTES
Internal Medicine Outpatient Visit  Chief Complaint   Patient presents with    Hospital Follow-up     TCM [facility] 5/9/2024 -Pneumonia        HPI: Zechariah Christine is of 79 y.o. who is here for Internal Visit for the following issues:  Patient is seen office for follow from recent hospitalization.  He was admitted with cough and shortness of breath and possibly fever.  He was found to have bilateral basal pneumonia.  He was also in hypoxic respiratory failure.  He received IV antibiotic and steroids and oxygen supplement.  Patient was advised to stay in the hospital for few more days but he left the second day.  He is a still on antibiotic.  He has some wheezing and shortness of breath.  Denies any fever or chills.  Has no chest pain or palpitation.  Has no nausea matting pain abdomen.  He has no weakness of any extremity.  He denies any joint swelling or pain.  Review of other systems are negative.    Past Surgical History:   Procedure Laterality Date    OTHER SURGICAL HISTORY  02/07/2022    Colonoscopy    OTHER SURGICAL HISTORY  09/02/2022    Prostate surgery       Family History   Problem Relation Name Age of Onset    Alzheimer's disease Mother      Parkinsonism Father           Current Outpatient Medications on File Prior to Visit   Medication Sig Dispense Refill    albuterol 90 mcg/actuation inhaler 2 puffs every 4 hours if needed.      allopurinol (Zyloprim) 100 mg tablet Take 1 tablet (100 mg) by mouth once daily.      apixaban (Eliquis) 5 mg tablet Take 1 tablet (5 mg) by mouth 2 times a day. 60 tablet 11    benazepriL-hydrochlorothiazide (Lotensin HCT) 10-12.5 mg tablet Take 1 tablet by mouth once daily. 90 tablet 1    doxycycline (Vibramycin) 100 mg capsule Take 1 capsule (100 mg) by mouth twice a day.      ergocalciferol (Vitamin D-2) 1.25 MG (51581 UT) capsule Take 1 capsule (1,250 mcg) by mouth 1 (one) time per week.      lutein 10 mg tablet Take 10 mg by mouth once daily.       "metoprolol succinate XL (Toprol-XL) 25 mg 24 hr tablet Take 1 tablet (25 mg) by mouth once daily. 90 tablet 2    predniSONE (Deltasone) 20 mg tablet Take 1 tablet (20 mg) by mouth once daily.      pregabalin (Lyrica) 50 mg capsule Take 1 capsule (50 mg) by mouth 3 times a day.      simvastatin (Zocor) 40 mg tablet Take 1 tablet (40 mg) by mouth once daily at bedtime. 90 tablet 1    vit A/vit C/vit E/zinc/copper (PRESERVISION AREDS ORAL) Take 600 mg by mouth once daily.      [DISCONTINUED] finasteride (Proscar) 5 mg tablet Take 1 tablet (5 mg) by mouth once daily.      [DISCONTINUED] mv-mn/om3/dha/epa/fish/lut/moise (OCUVITE ADULT 50 PLUS ORAL) Take 1 capsule by mouth 2 times a day. one capsule in the morning and one capsule in the evening      [DISCONTINUED] traMADol (Ultram) 50 mg tablet Take 1 tablet (50 mg) by mouth every 4 hours if needed for moderate pain (4 - 6).      [DISCONTINUED] traZODone (Desyrel) 50 mg tablet Take 1 tablet (50 mg) by mouth as needed at bedtime for sleep.       No current facility-administered medications on file prior to visit.       Blood pressure 118/60, pulse 53, height 1.778 m (5' 10\"), weight 91.6 kg (202 lb), SpO2 98%.  Body mass index is 28.98 kg/m².  General examination: There is no acute distress  Eyes: There is no pallor or jaundice pupils are equal and reactive to light  Oral cavity throat normal  Neck: There is no carotid bruit or JVD  Lungs: Bilateral rhonchi is present  CVS: Heart sounds are regular there is no gallop murmur  Abdomen: There is no megaly tenderness  CNS: Power and sensations are normal  Joints: No swelling  Legs: Trace of ankle edema is present  Skin: No rash    1. Hospital discharge follow-up  His medications are reconciled.  Tolerance and compliance is discussed with the patient.  He is on doxycycline.  He is advised to complete the course.  He is also on rescue inhaler.  The result of the test done in the hospital discussed with the patient.  - CBC and Auto " Differential; Future  - Comprehensive metabolic panel; Future    2. Community acquired pneumonia, bilateral  Complete the course of doxycycline  - CBC and Auto Differential; Future  - Comprehensive metabolic panel; Future    3. Acute respiratory failure with hypoxia (Multi): Improved.  Oxygen saturation 98% on room air in my office  - CBC and Auto Differential; Future  - Comprehensive metabolic panel; Future    4. Primary hypertension  Controlled on the current dose of metoprolol  - Comprehensive metabolic panel; Future    5. Mixed hyperlipidemia  Continued on simvastatin  - Lipid panel; Future    6. Current smoker  Educated about health risk associated with smoking.  He already has COPD and recent pneumonia.  Strongly encouraged to stop completely.  He is trying to cut down at this time  - Referral to Pulmonology; Future    7. Coronary artery disease involving native coronary artery of native heart without angina pectoris  Patient has appointment with his cardiologist  today    8. Hyperglycemia  - Hemoglobin A1c; Future    9. Vitamin D deficiency  Check vitamin D level  - Vitamin D 25-Hydroxy,Total (for eval of Vitamin D levels); Future    10. Chronic bronchitis, unspecified chronic bronchitis type (Multi)  Patient has seen lung specialist before for lung nodule.  A new referral is given  - Referral to Pulmonology; Future  - CBC and Auto Differential; Future    11.  Health maintenance: He had a colonoscopy done in 2020.  He goes to Dr. Thorne his urologist for prostate checkup.  His follow-up appointment will depend on the result of the test ordered today.

## 2024-05-15 NOTE — PROGRESS NOTES
1. CAD, high calcium score  2. Active smoker, emphysema  3. Hypertension  4. Hyperlipidemia  5. AAA percutaneous intravascular stenting 11/2022  6. Overweight mild    Patient is a pleasant 79-year-old male with the above-noted pertinent past medical history who presents today for recent hospital admission at [facility], he is complicated by his daughter who provides the history that while he was in [place] he began having very low energy, and cough mild leg swelling upon return he was admitted for pneumonia, pending EKG performed had revealed myocardial infarction of undetermined age yet with no cardiac troponin enzyme increase or associated symptoms.  He has no complaints of orthopnea PND palpitation or syncope today    Vital signs reviewed and stable BMI is mildly elevated 28.2, heart rate of 58 bpm and blood pressure 130/76 mmHg patient is a well-developed well-nourished male in no acute distress speaking full sentences no jugular venous distention, heart rate and rhythm are regular S1-S2 are normal no gallop, soft systolic murmur best at the right is secondary costal space is present, lungs decreased breath sound but clear, lower extremity no edema and no evidence of vascular insufficiency    5/2024  Sodium 137, potassium 3.8, BUN 17, creatinine of 0.97 with normal liver function test  Hemoglobin of 13.3 hematocrit of 39.4  A1c of 5.9%  Total cholesterol 150, triglyceride 90, HDL 55, and LDL of 77    Coronary calcium is evident by high calcium scoring with poor risk factor modification remains an active smoker complete smoking cessation was discussed and recommended  Abnormal EKG with evidence of prior myocardial infarction with undetermined age negative cardiac isoenzymes suspect due to lead placement    Requested an echocardiogram for assessment of his heart murmur EKG and his mild lower extremity edema that he had experience while on vacation which might have been due to  multifactorial noncompliant with his low-sodium diet as well is tropical temperatures  Hypertension under good control  Hyperlipidemia under good control  Patient is to return to my clinic after above testing completed.

## 2024-05-30 ENCOUNTER — PATIENT OUTREACH (OUTPATIENT)
Dept: PRIMARY CARE | Facility: CLINIC | Age: 79
End: 2024-05-30
Payer: MEDICARE

## 2024-05-30 NOTE — PROGRESS NOTES
Unable to reach patient for call back after patient's follow up appointment with PCP 5/15/24 & Cardio 5/15/24.     LVM with call back number for patient to call if needed  to assist with any questions or concerns patient may have.

## 2024-06-11 ENCOUNTER — HOSPITAL ENCOUNTER (OUTPATIENT)
Dept: CARDIOLOGY | Facility: CLINIC | Age: 79
Discharge: HOME | End: 2024-06-11
Payer: MEDICARE

## 2024-06-11 VITALS
SYSTOLIC BLOOD PRESSURE: 126 MMHG | HEIGHT: 71 IN | DIASTOLIC BLOOD PRESSURE: 76 MMHG | WEIGHT: 202 LBS | BODY MASS INDEX: 28.28 KG/M2

## 2024-06-11 DIAGNOSIS — R01.1 HEART MURMUR: ICD-10-CM

## 2024-06-11 DIAGNOSIS — E78.00 HYPERCHOLESTEROLEMIA: ICD-10-CM

## 2024-06-11 DIAGNOSIS — I10 PRIMARY HYPERTENSION: ICD-10-CM

## 2024-06-11 DIAGNOSIS — I25.10 CORONARY ARTERY DISEASE INVOLVING NATIVE CORONARY ARTERY OF NATIVE HEART WITHOUT ANGINA PECTORIS: ICD-10-CM

## 2024-06-11 PROCEDURE — 93306 TTE W/DOPPLER COMPLETE: CPT

## 2024-06-11 PROCEDURE — 93356 MYOCRD STRAIN IMG SPCKL TRCK: CPT | Performed by: INTERNAL MEDICINE

## 2024-06-11 PROCEDURE — 93356 MYOCRD STRAIN IMG SPCKL TRCK: CPT

## 2024-06-11 PROCEDURE — 93306 TTE W/DOPPLER COMPLETE: CPT | Performed by: INTERNAL MEDICINE

## 2024-06-12 ENCOUNTER — APPOINTMENT (OUTPATIENT)
Dept: PRIMARY CARE | Facility: CLINIC | Age: 79
End: 2024-06-12
Payer: MEDICARE

## 2024-06-12 LAB
AORTIC VALVE MEAN GRADIENT: 9 MMHG
AORTIC VALVE PEAK VELOCITY: 1.85 M/S
AV PEAK GRADIENT: 13.7 MMHG
AVA (PEAK VEL): 1.59 CM2
AVA (VTI): 1.82 CM2
EJECTION FRACTION APICAL 4 CHAMBER: 64.9
LEFT VENTRICLE INTERNAL DIMENSION DIASTOLE: 4.53 CM (ref 3.5–6)
LEFT VENTRICULAR OUTFLOW TRACT DIAMETER: 2.1 CM
MITRAL VALVE E/A RATIO: 0.9
MITRAL VALVE E/E' RATIO: 7
RIGHT VENTRICLE PEAK SYSTOLIC PRESSURE: 27.8 MMHG

## 2024-06-18 ENCOUNTER — APPOINTMENT (OUTPATIENT)
Dept: CARDIOLOGY | Facility: CLINIC | Age: 79
End: 2024-06-18
Payer: MEDICARE

## 2024-06-18 VITALS
TEMPERATURE: 98 F | BODY MASS INDEX: 27.72 KG/M2 | DIASTOLIC BLOOD PRESSURE: 84 MMHG | SYSTOLIC BLOOD PRESSURE: 132 MMHG | HEART RATE: 54 BPM | WEIGHT: 198 LBS | HEIGHT: 71 IN

## 2024-06-18 DIAGNOSIS — I25.10 CORONARY ARTERY DISEASE INVOLVING NATIVE CORONARY ARTERY OF NATIVE HEART WITHOUT ANGINA PECTORIS: Primary | ICD-10-CM

## 2024-06-18 DIAGNOSIS — I10 PRIMARY HYPERTENSION: ICD-10-CM

## 2024-06-18 DIAGNOSIS — E66.3 OVER WEIGHT: ICD-10-CM

## 2024-06-18 DIAGNOSIS — E78.00 HYPERCHOLESTEROLEMIA: ICD-10-CM

## 2024-06-18 PROCEDURE — 99214 OFFICE O/P EST MOD 30 MIN: CPT | Performed by: INTERNAL MEDICINE

## 2024-06-18 PROCEDURE — 3079F DIAST BP 80-89 MM HG: CPT | Performed by: INTERNAL MEDICINE

## 2024-06-18 PROCEDURE — 1159F MED LIST DOCD IN RCRD: CPT | Performed by: INTERNAL MEDICINE

## 2024-06-18 PROCEDURE — 1126F AMNT PAIN NOTED NONE PRSNT: CPT | Performed by: INTERNAL MEDICINE

## 2024-06-18 PROCEDURE — 3075F SYST BP GE 130 - 139MM HG: CPT | Performed by: INTERNAL MEDICINE

## 2024-06-18 RX ORDER — VALACYCLOVIR HYDROCHLORIDE 1 G/1
1000 TABLET, FILM COATED ORAL
COMMUNITY
Start: 2024-06-01

## 2024-06-18 RX ORDER — BUDESONIDE AND FORMOTEROL FUMARATE DIHYDRATE 160; 4.5 UG/1; UG/1
2 AEROSOL RESPIRATORY (INHALATION)
COMMUNITY

## 2024-06-18 RX ORDER — ALBUTEROL SULFATE 90 UG/1
2 AEROSOL, METERED RESPIRATORY (INHALATION) EVERY 6 HOURS PRN
COMMUNITY
Start: 2024-06-11

## 2024-06-18 ASSESSMENT — PAIN SCALES - GENERAL: PAINLEVEL: 0-NO PAIN

## 2024-06-18 NOTE — PROGRESS NOTES
1. CAD, high calcium score  2. Active smoker, emphysema  3. Hypertension  4. Hyperlipidemia  5. AAA percutaneous intravascular stenting 11/2022  6. Overweight mild    Patient is a pleasant 79-year-old male with the above-noted pertinent past medical history who presents today for follow-up.  He has no complaints of exertional chest pain or shortness of breath when questioned regarding details of walking he states that he approximately walks about a mile daily but this is with his activities of daily living he does not have any scheduled regimen exercises, he continues to be an active smoker approximately half a pack per day however states that he has been seen and evaluated by the lung doctor and is currently planning proceeding with a smoking cessation program, he has no complaints of orthopnea PND palpitation or syncopal episode.    BMI is elevated at 27.6, which is somewhat lower than 28.2 previously blood pressure of 132/84 mmHg and the heart rate mildly bradycardic at 54 bpm which remains also stable, patient is a well-developed well-nourished male in no acute distress speaking full sentences no carotid bruits upstroke and volumes are normal heart rate and rhythm are regular S1-S2 are normal no gallop, soft grade 2/6 systolic murmur at the right sternal border is present, lungs decreased breath sound but clear abdomen is moderately distended positive bowel sounds soft and nontender    February 2024  Total cholesterol 150, triglyceride 90, HDL 55, and LDL of 77    Coronary artery disease as noted by the elevated coronary calcium scoring the importance of risk factor modification was extensively discussed recommendations for complete smoking cessation was discussed patient was encouraged to see the physician for smoking cessation program and follow recommendations  Hyperlipidemia his latest cholesterol profile was reviewed and discussed with  Hypertension under good control  Mildly overweight heart healthy diet  and weight loss recommended  Activity under recommended level increasing level of activity to at least 4-5000 steps is recommended  Return to my clinic in 6 months for follow-up.

## 2024-07-22 DIAGNOSIS — I10 PRIMARY HYPERTENSION: ICD-10-CM

## 2024-07-22 RX ORDER — BENAZEPRIL HYDROCHLORIDE AND HYDROCHLOROTHIAZIDE 10; 12.5 MG/1; MG/1
1 TABLET ORAL DAILY
Qty: 90 TABLET | Refills: 1 | Status: SHIPPED | OUTPATIENT
Start: 2024-07-22

## 2024-08-12 NOTE — PROGRESS NOTES
Discharge Facility:  [facility]     Discharge Diagnosis:  -Pneumonia of both lungs due to infectious organism (POA: Yes)  -Hypoxia (POA: Yes)     Admission Date:5/9/24  Discharge Date: 5/10/24    PCP Appointment Date: updated appt info per TCM workflow to reflect hospital stay    5/15/2024 9:40 AM  [facility]   [facility]   [facility]   Tony Lopez MD     Specialist Appointment Date:    5/15/2024 10:45 AM  [facility]   [facility]IOLOG[facility] ESTABLISHED PAT[facility]   [facility]   Brandon Ellis MD     Hospital Encounter and Summary: Linked     Two attempts were made to reach patient within two business days after discharge. I was unable to leave voicemail to introduce myself and the TCM program to Zcehariah Christine can go over discharge instructions as voicemail box is full.          
Bilateral.       Radiographs:  3 views right foot reviewed     3 views right ankle reviewed.      I agree with the findings.  There is medial deviation of the hallux and all of the lesser toes.  There is slight midfoot djd but no acute findings.   No fracture or dislcation.      Asessment: Patient is a 74 y.o. male with:    Diagnosis Orders   1. Benign neoplasm of skin of lower limb, including hip, right  DESTRUC BENIGN LESION, UP TO 14 LESIONS      2. Right foot pain  DESTRUC BENIGN LESION, UP TO 14 LESIONS      3. Hallux varus (acquired), right foot              Plan: Patient examined and evaluated.  Current condition and treatment options discussed in detail.  Discussed conservative and surgical options with the patient. Advised pt to her condtion.    The lesio was  partially excised via 15 blade and silver nitrate was applied under occlusion.  The patient tolerated the procedure well and without complication.  Advised patient to use vasoline to the area after tomorrow to prevent surrounding tissue irritation.     Pt to use toe spacers to the right foot to allow the 5th toe to not touch the 4th toe.   Pt to use lotion to the area daily or vasoline        I had a long discussion today with the patient about the likely diagnosis and its natural history, physical exam and imaging findings, as well as treatment options. We discussed both surgical and nonsurgical treatments, including risks and benefits. From a nonoperative standpoint, we discussed rest/activity modification, NSAIDs/Acetaminophen/topical anesthetics, orthotics, bracing/immobilization, and physical therapy.      Surgically, I do not think surgery would help at this time.  Will try conservative treatment first.       All labs were reviewed including BMP CBC and lipid profile  and all imaging of the right foot and right ankle x rays including the above findings were reviewed PRIOR to the patients arrival and with the patient today.    Previous patient

## 2024-09-23 ENCOUNTER — APPOINTMENT (OUTPATIENT)
Dept: CARDIOLOGY | Facility: CLINIC | Age: 79
End: 2024-09-23
Payer: MEDICARE

## 2024-10-19 DIAGNOSIS — I48.3 TYPICAL ATRIAL FLUTTER (MULTI): ICD-10-CM

## 2024-10-23 RX ORDER — APIXABAN 5 MG/1
5 TABLET, FILM COATED ORAL 2 TIMES DAILY
Qty: 180 TABLET | Refills: 3 | Status: SHIPPED | OUTPATIENT
Start: 2024-10-23

## 2024-10-30 ENCOUNTER — APPOINTMENT (OUTPATIENT)
Dept: PRIMARY CARE | Facility: CLINIC | Age: 79
End: 2024-10-30
Payer: MEDICARE

## 2024-10-30 VITALS
HEART RATE: 67 BPM | DIASTOLIC BLOOD PRESSURE: 72 MMHG | WEIGHT: 202 LBS | SYSTOLIC BLOOD PRESSURE: 122 MMHG | BODY MASS INDEX: 28.28 KG/M2 | OXYGEN SATURATION: 94 % | HEIGHT: 71 IN

## 2024-10-30 DIAGNOSIS — E79.0 HYPERURICEMIA: ICD-10-CM

## 2024-10-30 DIAGNOSIS — Z86.79 HISTORY OF ATRIAL FLUTTER: ICD-10-CM

## 2024-10-30 DIAGNOSIS — R73.9 HYPERGLYCEMIA: ICD-10-CM

## 2024-10-30 DIAGNOSIS — J42 CHRONIC BRONCHITIS, UNSPECIFIED CHRONIC BRONCHITIS TYPE (MULTI): ICD-10-CM

## 2024-10-30 DIAGNOSIS — I10 PRIMARY HYPERTENSION: Primary | ICD-10-CM

## 2024-10-30 DIAGNOSIS — I49.1 PAC (PREMATURE ATRIAL CONTRACTION): ICD-10-CM

## 2024-10-30 DIAGNOSIS — E55.9 VITAMIN D DEFICIENCY: ICD-10-CM

## 2024-10-30 DIAGNOSIS — E78.2 MIXED HYPERLIPIDEMIA: ICD-10-CM

## 2024-10-30 DIAGNOSIS — E78.5 HYPERLIPIDEMIA, UNSPECIFIED: ICD-10-CM

## 2024-10-30 PROCEDURE — 99214 OFFICE O/P EST MOD 30 MIN: CPT | Performed by: INTERNAL MEDICINE

## 2024-10-30 PROCEDURE — 90662 IIV NO PRSV INCREASED AG IM: CPT | Performed by: INTERNAL MEDICINE

## 2024-10-30 PROCEDURE — 1158F ADVNC CARE PLAN TLK DOCD: CPT | Performed by: INTERNAL MEDICINE

## 2024-10-30 PROCEDURE — 1123F ACP DISCUSS/DSCN MKR DOCD: CPT | Performed by: INTERNAL MEDICINE

## 2024-10-30 PROCEDURE — G0008 ADMIN INFLUENZA VIRUS VAC: HCPCS | Performed by: INTERNAL MEDICINE

## 2024-10-30 PROCEDURE — 3074F SYST BP LT 130 MM HG: CPT | Performed by: INTERNAL MEDICINE

## 2024-10-30 PROCEDURE — 1159F MED LIST DOCD IN RCRD: CPT | Performed by: INTERNAL MEDICINE

## 2024-10-30 PROCEDURE — 3078F DIAST BP <80 MM HG: CPT | Performed by: INTERNAL MEDICINE

## 2024-10-30 RX ORDER — BUDESONIDE AND FORMOTEROL FUMARATE DIHYDRATE 160; 4.5 UG/1; UG/1
2 AEROSOL RESPIRATORY (INHALATION)
Qty: 10.2 G | Refills: 3 | Status: SHIPPED | OUTPATIENT
Start: 2024-10-30

## 2024-10-30 RX ORDER — BENAZEPRIL HYDROCHLORIDE AND HYDROCHLOROTHIAZIDE 10; 12.5 MG/1; MG/1
1 TABLET ORAL DAILY
Qty: 90 TABLET | Refills: 1 | Status: SHIPPED | OUTPATIENT
Start: 2024-10-30

## 2024-10-30 RX ORDER — ALBUTEROL SULFATE 90 UG/1
2 INHALANT RESPIRATORY (INHALATION) EVERY 6 HOURS PRN
Qty: 18 G | Refills: 1 | Status: SHIPPED | OUTPATIENT
Start: 2024-10-30

## 2024-10-30 RX ORDER — ERGOCALCIFEROL 1.25 MG/1
1 CAPSULE ORAL
Qty: 13 CAPSULE | Refills: 1 | Status: SHIPPED | OUTPATIENT
Start: 2024-11-03

## 2024-10-30 RX ORDER — ALLOPURINOL 100 MG/1
100 TABLET ORAL DAILY
Qty: 90 TABLET | Refills: 1 | Status: SHIPPED | OUTPATIENT
Start: 2024-10-30

## 2024-10-30 RX ORDER — SIMVASTATIN 40 MG/1
40 TABLET, FILM COATED ORAL NIGHTLY
Qty: 90 TABLET | Refills: 1 | Status: SHIPPED | OUTPATIENT
Start: 2024-10-30

## 2024-10-30 RX ORDER — METOPROLOL SUCCINATE 25 MG/1
25 TABLET, EXTENDED RELEASE ORAL DAILY
Qty: 90 TABLET | Refills: 1 | Status: SHIPPED | OUTPATIENT
Start: 2024-10-30

## 2024-10-30 ASSESSMENT — ENCOUNTER SYMPTOMS
OCCASIONAL FEELINGS OF UNSTEADINESS: 0
LOSS OF SENSATION IN FEET: 0
DEPRESSION: 0

## 2024-10-30 ASSESSMENT — PATIENT HEALTH QUESTIONNAIRE - PHQ9
2. FEELING DOWN, DEPRESSED OR HOPELESS: NOT AT ALL
1. LITTLE INTEREST OR PLEASURE IN DOING THINGS: NOT AT ALL
SUM OF ALL RESPONSES TO PHQ9 QUESTIONS 1 & 2: 0

## 2024-11-02 ENCOUNTER — LAB (OUTPATIENT)
Dept: LAB | Facility: LAB | Age: 79
End: 2024-11-02
Payer: MEDICARE

## 2024-11-02 DIAGNOSIS — Z09 HOSPITAL DISCHARGE FOLLOW-UP: ICD-10-CM

## 2024-11-02 DIAGNOSIS — I10 PRIMARY HYPERTENSION: ICD-10-CM

## 2024-11-02 DIAGNOSIS — R73.9 HYPERGLYCEMIA: ICD-10-CM

## 2024-11-02 DIAGNOSIS — J42 CHRONIC BRONCHITIS, UNSPECIFIED CHRONIC BRONCHITIS TYPE (MULTI): ICD-10-CM

## 2024-11-02 DIAGNOSIS — E78.2 MIXED HYPERLIPIDEMIA: ICD-10-CM

## 2024-11-02 DIAGNOSIS — J18.9 COMMUNITY ACQUIRED PNEUMONIA, BILATERAL: ICD-10-CM

## 2024-11-02 DIAGNOSIS — J96.01 ACUTE RESPIRATORY FAILURE WITH HYPOXIA (MULTI): ICD-10-CM

## 2024-11-02 DIAGNOSIS — E55.9 VITAMIN D DEFICIENCY: ICD-10-CM

## 2024-11-02 PROCEDURE — 83036 HEMOGLOBIN GLYCOSYLATED A1C: CPT

## 2024-11-02 PROCEDURE — 80061 LIPID PANEL: CPT

## 2024-11-02 PROCEDURE — 36415 COLL VENOUS BLD VENIPUNCTURE: CPT

## 2024-11-02 PROCEDURE — 85025 COMPLETE CBC W/AUTO DIFF WBC: CPT

## 2024-11-02 PROCEDURE — 80053 COMPREHEN METABOLIC PANEL: CPT

## 2024-11-02 PROCEDURE — 82306 VITAMIN D 25 HYDROXY: CPT

## 2024-11-03 LAB
25(OH)D3 SERPL-MCNC: 46 NG/ML (ref 30–100)
ALBUMIN SERPL BCP-MCNC: 4.2 G/DL (ref 3.4–5)
ALP SERPL-CCNC: 54 U/L (ref 33–136)
ALT SERPL W P-5'-P-CCNC: 11 U/L (ref 10–52)
ANION GAP SERPL CALC-SCNC: 14 MMOL/L (ref 10–20)
AST SERPL W P-5'-P-CCNC: 15 U/L (ref 9–39)
BASOPHILS # BLD AUTO: 0.06 X10*3/UL (ref 0–0.1)
BASOPHILS NFR BLD AUTO: 1 %
BILIRUB SERPL-MCNC: 0.7 MG/DL (ref 0–1.2)
BUN SERPL-MCNC: 19 MG/DL (ref 6–23)
CALCIUM SERPL-MCNC: 9 MG/DL (ref 8.6–10.6)
CHLORIDE SERPL-SCNC: 103 MMOL/L (ref 98–107)
CHOLEST SERPL-MCNC: 147 MG/DL (ref 0–199)
CHOLESTEROL/HDL RATIO: 2.7
CO2 SERPL-SCNC: 29 MMOL/L (ref 21–32)
CREAT SERPL-MCNC: 1.12 MG/DL (ref 0.5–1.3)
EGFRCR SERPLBLD CKD-EPI 2021: 66 ML/MIN/1.73M*2
EOSINOPHIL # BLD AUTO: 0.3 X10*3/UL (ref 0–0.4)
EOSINOPHIL NFR BLD AUTO: 5 %
ERYTHROCYTE [DISTWIDTH] IN BLOOD BY AUTOMATED COUNT: 12.9 % (ref 11.5–14.5)
EST. AVERAGE GLUCOSE BLD GHB EST-MCNC: 114 MG/DL
GLUCOSE SERPL-MCNC: 105 MG/DL (ref 74–99)
HBA1C MFR BLD: 5.6 %
HCT VFR BLD AUTO: 42.5 % (ref 41–52)
HDLC SERPL-MCNC: 54.6 MG/DL
HGB BLD-MCNC: 14.2 G/DL (ref 13.5–17.5)
IMM GRANULOCYTES # BLD AUTO: 0.02 X10*3/UL (ref 0–0.5)
IMM GRANULOCYTES NFR BLD AUTO: 0.3 % (ref 0–0.9)
LDLC SERPL CALC-MCNC: 75 MG/DL
LYMPHOCYTES # BLD AUTO: 1.06 X10*3/UL (ref 0.8–3)
LYMPHOCYTES NFR BLD AUTO: 17.6 %
MCH RBC QN AUTO: 32.6 PG (ref 26–34)
MCHC RBC AUTO-ENTMCNC: 33.4 G/DL (ref 32–36)
MCV RBC AUTO: 98 FL (ref 80–100)
MONOCYTES # BLD AUTO: 0.61 X10*3/UL (ref 0.05–0.8)
MONOCYTES NFR BLD AUTO: 10.1 %
NEUTROPHILS # BLD AUTO: 3.96 X10*3/UL (ref 1.6–5.5)
NEUTROPHILS NFR BLD AUTO: 66 %
NON HDL CHOLESTEROL: 92 MG/DL (ref 0–149)
NRBC BLD-RTO: 0 /100 WBCS (ref 0–0)
PLATELET # BLD AUTO: 183 X10*3/UL (ref 150–450)
POTASSIUM SERPL-SCNC: 4.6 MMOL/L (ref 3.5–5.3)
PROT SERPL-MCNC: 6.5 G/DL (ref 6.4–8.2)
RBC # BLD AUTO: 4.36 X10*6/UL (ref 4.5–5.9)
SODIUM SERPL-SCNC: 141 MMOL/L (ref 136–145)
TRIGL SERPL-MCNC: 89 MG/DL (ref 0–149)
VLDL: 18 MG/DL (ref 0–40)
WBC # BLD AUTO: 6 X10*3/UL (ref 4.4–11.3)

## 2024-11-04 ENCOUNTER — TELEPHONE (OUTPATIENT)
Dept: PRIMARY CARE | Facility: CLINIC | Age: 79
End: 2024-11-04
Payer: MEDICARE

## 2024-11-04 NOTE — TELEPHONE ENCOUNTER
Left a message for patient about his results.  ----- Message from Tony Lopez sent at 11/4/2024 11:27 AM EST -----  Results are okay  ----- Message -----  From: Lab, Background User  Sent: 11/3/2024   1:28 AM EST  To: Tony Lopez MD

## 2024-11-19 DIAGNOSIS — I49.1 PAC (PREMATURE ATRIAL CONTRACTION): ICD-10-CM

## 2024-11-22 RX ORDER — METOPROLOL SUCCINATE 25 MG/1
25 TABLET, EXTENDED RELEASE ORAL DAILY
Qty: 90 TABLET | Refills: 3 | Status: SHIPPED | OUTPATIENT
Start: 2024-11-22

## 2024-12-19 ENCOUNTER — APPOINTMENT (OUTPATIENT)
Dept: CARDIOLOGY | Facility: CLINIC | Age: 79
End: 2024-12-19
Payer: MEDICARE

## 2025-01-09 ENCOUNTER — APPOINTMENT (OUTPATIENT)
Dept: CARDIOLOGY | Facility: CLINIC | Age: 80
End: 2025-01-09
Payer: MEDICARE

## 2025-01-09 VITALS
BODY MASS INDEX: 28.73 KG/M2 | HEIGHT: 71 IN | OXYGEN SATURATION: 87 % | HEART RATE: 70 BPM | WEIGHT: 205.2 LBS | TEMPERATURE: 97.6 F | DIASTOLIC BLOOD PRESSURE: 90 MMHG | SYSTOLIC BLOOD PRESSURE: 130 MMHG

## 2025-01-09 DIAGNOSIS — R93.89 ABNORMAL COMPUTERIZED AXIAL TOMOGRAPHY OF CHEST: ICD-10-CM

## 2025-01-09 DIAGNOSIS — R93.1 ELEVATED CORONARY ARTERY CALCIUM SCORE: Primary | ICD-10-CM

## 2025-01-09 DIAGNOSIS — I49.1 PREMATURE ATRIAL CONTRACTION: ICD-10-CM

## 2025-01-09 DIAGNOSIS — I10 PRIMARY HYPERTENSION: ICD-10-CM

## 2025-01-09 DIAGNOSIS — I48.3 TYPICAL ATRIAL FLUTTER (MULTI): ICD-10-CM

## 2025-01-09 DIAGNOSIS — E78.00 HYPERCHOLESTEROLEMIA: ICD-10-CM

## 2025-01-09 DIAGNOSIS — Z79.01 CHRONIC ANTICOAGULATION: ICD-10-CM

## 2025-01-09 PROBLEM — I48.91 ATRIAL FIBRILLATION (MULTI): Status: ACTIVE | Noted: 2024-07-11

## 2025-01-09 PROCEDURE — 1159F MED LIST DOCD IN RCRD: CPT | Performed by: INTERNAL MEDICINE

## 2025-01-09 PROCEDURE — G2211 COMPLEX E/M VISIT ADD ON: HCPCS | Performed by: INTERNAL MEDICINE

## 2025-01-09 PROCEDURE — 1160F RVW MEDS BY RX/DR IN RCRD: CPT | Performed by: INTERNAL MEDICINE

## 2025-01-09 PROCEDURE — 93000 ELECTROCARDIOGRAM COMPLETE: CPT | Performed by: INTERNAL MEDICINE

## 2025-01-09 PROCEDURE — 1123F ACP DISCUSS/DSCN MKR DOCD: CPT | Performed by: INTERNAL MEDICINE

## 2025-01-09 PROCEDURE — 99214 OFFICE O/P EST MOD 30 MIN: CPT | Performed by: INTERNAL MEDICINE

## 2025-01-09 PROCEDURE — 3080F DIAST BP >= 90 MM HG: CPT | Performed by: INTERNAL MEDICINE

## 2025-01-09 PROCEDURE — 3075F SYST BP GE 130 - 139MM HG: CPT | Performed by: INTERNAL MEDICINE

## 2025-01-09 RX ORDER — DOXYCYCLINE 100 MG/1
100 CAPSULE ORAL 2 TIMES DAILY
COMMUNITY
Start: 2025-01-06

## 2025-01-09 RX ORDER — PREDNISONE 20 MG/1
40 TABLET ORAL
COMMUNITY
Start: 2025-01-06

## 2025-01-09 RX ORDER — IPRATROPIUM BROMIDE AND ALBUTEROL SULFATE 2.5; .5 MG/3ML; MG/3ML
3 SOLUTION RESPIRATORY (INHALATION) EVERY 6 HOURS PRN
COMMUNITY
Start: 2025-01-06

## 2025-01-09 ASSESSMENT — ENCOUNTER SYMPTOMS
MUSCULOSKELETAL NEGATIVE: 1
SLEEP DISTURBANCE: 1
SHORTNESS OF BREATH: 1
PALPITATIONS: 0
STRIDOR: 0
COUGH: 0
GASTROINTESTINAL NEGATIVE: 1
WHEEZING: 0
NEUROLOGICAL NEGATIVE: 1

## 2025-01-09 NOTE — PROGRESS NOTES
"  Patient:  Zechariah Christine  YOB: 1945  MRN: [id number]       Chief Complaint/Active Symptoms:       Zechariah Christine is a 79 y.o. male who returns today for cardiac follow-up.    Patient is here for 6-month follow-up with cardiology.  He is reported to have a high CT cardiac score however I cannot identify a copy of the CT cardiac score in his current chart to know exactly what that score was.  He has a history of hypertension, hyperlipidemia and abdominal aortic aneurysm status post stenting in November 2022.    Patient is listed as having a history of atrial fibrillation and atrial flutter.  Review of an electrophysiology note from November 2023 so that he had a monitor showing a high burden of premature atrial beats of 32.7% and an EKG while hospitalized at [facility] showing classic atrial flutter.  Given the patient's [id number] score of 4 it was recommended that he remain on chronic anticoagulant therapy and he was cardioverted once he had been anticoagulated for a month.  It was discussed with him at that time if he had any recurrence of atrial flutter he will be referred for ablation.    No chest pain or discomfort. Has chronic SOB on exertion that is stable. No orthopnea or PND. Has some chronic edema for which he wears compression stockings. No rapid or irregular heart beats. No dizziness or lightheadedness. No syncope or near syncope.    Review of Systems   Respiratory:  Positive for shortness of breath. Negative for cough, wheezing and stridor.    Cardiovascular:  Positive for leg swelling. Negative for chest pain and palpitations.   Gastrointestinal: Negative.    Genitourinary: Negative.    Musculoskeletal: Negative.    Neurological: Negative.    Psychiatric/Behavioral:  Positive for sleep disturbance.    All other systems reviewed and are negative.      Objective:     Visit Vitals  /90   Pulse 70   Temp 36.4 °C (97.6 °F)   Ht 1.803 m (5' 11\")   Wt 93.1 kg (205 lb 3.2 oz)   SpO2 (!) " 87%   BMI 28.62 kg/m²   Smoking Status Every Day   BSA 2.16 m²         Allergies:     No Known Allergies       Medications:     Current Outpatient Medications   Medication Instructions    albuterol 90 mcg/actuation inhaler 2 puffs, inhalation, Every 6 hours PRN    allopurinol (ZYLOPRIM) 100 mg, oral, Daily    benazepriL-hydrochlorothiazide (Lotensin HCT) 10-12.5 mg tablet 1 tablet, oral, Daily    doxycycline (MONODOX) 100 mg, 2 times daily    Eliquis 5 mg, oral, 2 times daily    ergocalciferol (VITAMIN D-2) 1,250 mcg, oral, Once Weekly    ipratropium-albuteroL (Duo-Neb) 0.5-2.5 mg/3 mL nebulizer solution 3 mL, Every 6 hours PRN    lutein 10 mg, [facility] ONC Daily for 7 Days in a 14 Day Cycle    metoprolol succinate XL (TOPROL-XL) 25 mg, oral, Daily    predniSONE (DELTASONE) 40 mg, Daily RT    pregabalin (LYRICA) 50 mg, 3 times daily    simvastatin (ZOCOR) 40 mg, oral, Nightly    Symbicort 160-4.5 mcg/actuation inhaler 2 puffs, inhalation, 2 times daily RT    valACYclovir (VALTREX) 1,000 mg, Daily (0630)    vit A/vit C/vit E/zinc/copper (PRESERVISION AREDS ORAL) 600 mg, Daily       Physical Examination:   GENERAL:  Well developed, well nourished, in no acute distress.  HEENT: NC AT, EOMI with anicteric sclera  NECK:   no JVD, no bruit.  CHEST:  Symmetric and nontender.  LUNGS:  Normal respiratory effort. Coarse rhonchi and diminished air entry all lung fields.   HEART:  PMI is nondisplaced. RRR with normal S1 and S2, no S3, no mumur or rub. No carotid or abdominal bruits  ABDOMEN: Soft, NT, ND without palpable organomegaly or bruits  EXTREMITIES:  Warm with good color, no clubbing or cyanosis.  There is no edema noted.  PERIPHERAL VASCULAR:  Pulses present and equally palpable; 2+ throughout.  MUSCULOSKELETAL: OA changes  NEURO/PSYCH:  Alert and oriented times three with approppriate behavior and responses. Nonfocal motor examination with normal gait and ambulation  Lymph: No significant palpable lymphadenopathy  Skin: no  "rash or lesions on exposed skin or reported.    Lab:     CBC:   Lab Results   Component Value Date    WBC 6.0 11/02/2024    RBC 4.36 (L) 11/02/2024    HGB 14.2 11/02/2024    HCT 42.5 11/02/2024     11/02/2024        CMP:    Lab Results   Component Value Date     11/02/2024    K 4.6 11/02/2024     11/02/2024    CO2 29 11/02/2024    BUN 19 11/02/2024    CREATININE 1.12 11/02/2024    GLUCOSE 105 (H) 11/02/2024    CALCIUM 9.0 11/02/2024       Magnesium:    No results found for: \"MG\"    Lipid Profile:    Lab Results   Component Value Date    TRIG 89 11/02/2024    HDL 54.6 11/02/2024    LDLCALC 75 11/02/2024       TSH:    No results found for: \"TSH\"    BNP:   No results found for: \"BNP\"     PT/INR:    No results found for: \"PROTIME\", \"INR\"    HgBA1c:    Lab Results   Component Value Date    HGBA1C 5.6 11/02/2024       BMP:  Lab Results   Component Value Date     11/02/2024     02/17/2024    K 4.6 11/02/2024    K 4.4 02/17/2024     11/02/2024     02/17/2024    CO2 29 11/02/2024    CO2 28 02/17/2024    BUN 19 11/02/2024    BUN 16 02/17/2024    CREATININE 1.12 11/02/2024    CREATININE 1.12 02/17/2024       Cardiac Enzymes:    No results found for: \"TROPHS\"    Hepatic Function Panel:    Lab Results   Component Value Date    ALKPHOS 54 11/02/2024    ALT 11 11/02/2024    AST 15 11/02/2024    PROT 6.5 11/02/2024    BILITOT 0.7 11/02/2024         Diagnostic Studies:     Transthoracic echo (TTE) complete    Result Date: 6/12/2024           [facility] [address], [place] [address] TRANSTHORACIC ECHOCARDIOGRAM REPORT  Patient Name:      KAPIL Bell Physician:    [id number] Frida Ellis MD Study Date:        6/11/2024             Ordering Provider:    [id number] FRIDA ELLIS MRN/PID:           [id number]              Fellow: " Accession#:        [id number]          Nurse: Date of Birth/Age: 1945 / 79 years   Sonographer:          Veronica Heredia RDMS, RCS, RVS Gender:            M                     Additional Staff: Height:            180.34 cm             Admit Date: Weight:            91.63 kg              Admission Status: BSA / BMI:         2.12 m2 / 28.17 kg/m2 Department Location:  [facility] Blood Pressure: 126 /76 mmHg Study Type:    TRANSTHORACIC ECHO (TTE) COMPLETE Diagnosis/ICD: Cardiac murmur, unspecified-R01.1; Atherosclerotic heart disease                of native coronary artery without angina pectoris-I25.10 Indication:    Murmur, CAD, HTN, ABN EKG, HLD, SOB, Current smoker, H/o AFib CPT Codes:     Echo Complete w Full Doppler-[id number]  Study Detail: The following Echo studies were performed: 2D, M-Mode, Doppler and               color flow.  PHYSICIAN INTERPRETATION: Left Ventricle: Left ventricular systolic function is normal. The left ventricular cavity size is normal. There is moderate concentric left ventricular hypertrophy. Left ventricular diastolic filling was indeterminate. LVEF 60+/-5% Images are poor qulity. Left Atrium: The left atrium was not well visualized. Right Ventricle: The right ventricle is normal in size. There is normal right ventricular global systolic function. Right Atrium: The right atrium was not well visualized. Aortic Valve: The aortic valve appears abnormal. There is moderate aortic valve cusp calcification. There is evidence of mild aortic valve stenosis. There is trivial aortic valve regurgitation. The peak instantaneous gradient of the aortic valve is 13.7 mmHg. The mean gradient of the aortic valve is 9.0 mmHg. Mitral Valve: The mitral valve was not well visualized. There is no evidence of mitral valve regurgitation. Tricuspid Valve: The tricuspid  valve was not well visualized. There is trace tricuspid regurgitation. The Doppler estimated RVSP is within normal limits at 27.8 mmHg. Pulmonic Valve: The pulmonic valve is not well visualized. The pulmonic valve regurgitation was not well visualized. Pericardium: There is no pericardial effusion noted. Aorta: The aortic root is abnormal. There is moderate dilatation of the ascending aorta. Ascending aorta measuring 4.4cm.  CONCLUSIONS:  1. Left ventricular systolic function is normal.  2. LVEF 60+/-5%     Images are poor qulity.  3. There is moderate concentric left ventricular hypertrophy.  4. RVSP within normal limits.  5. Aortic valve appears abnormal.  6. Mild aortic valve stenosis.  7. There is moderate aortic valve cusp calcification.  8. Ascending aorta measuring 4.4cm.  9. There is moderate dilatation of the ascending aorta. QUANTITATIVE DATA SUMMARY: 2D MEASUREMENTS:                           Normal Ranges: Ao Root d:     4.10 cm    (2.0-3.7cm) LAs:           3.30 cm    (2.7-4.0cm) RVIDd:         2.31 cm    (0.9-3.6cm) IVSd:          1.35 cm    (0.6-1.1cm) LVPWd:         1.18 cm    (0.6-1.1cm) LVIDd:         4.53 cm    (3.9-5.9cm) LVIDs:         3.09 cm LV Mass Index: 102.0 g/m2 LV % FS        31.8 % AORTA MEASUREMENTS:                    Normal Ranges: Asc Ao, d: 4.40 cm (2.1-3.4cm) LV SYSTOLIC FUNCTION BY 2D PLANIMETRY (MOD):                     Normal Ranges: EF-A4C View: 64.9 % (>=55%) LV DIASTOLIC FUNCTION:                        Normal Ranges: MV Peak E:    0.55 m/s (0.7-1.2 m/s) MV Peak A:    0.61 m/s (0.42-0.7 m/s) E/A Ratio:    0.90     (1.0-2.2) MV lateral e' 0.08 m/s MV medial e'  0.05 m/s E/e' Ratio:   7.00     (<8.0) MITRAL INSUFFICIENCY:                      Normal Ranges: MR Vmax: 240.00 cm/s AORTIC VALVE:                                    Normal Ranges: AoV Vmax:                1.85 m/s  (<=1.7m/s) AoV Peak P.7 mmHg (<20mmHg) AoV Mean P.0 mmHg   (1.7-11.5mmHg) LVOT Max [place]:            0.85 m/s  (<=1.1m/s) AoV VTI:                 37.10 cm  (18-25cm) LVOT VTI:                19.50 cm LVOT Diameter:           2.10 cm   (1.8-2.4cm) AoV Area, VTI:           1.82 cm2  (2.5-5.5cm2) AoV Area,Vmax:           1.59 cm2  (2.5-4.5cm2) AoV Dimensionless Index: 0.53 TRICUSPID VALVE/RVSP:                             Normal Ranges: Peak TR Velocity: 2.49 m/s RV Syst Pressure: 27.8 mmHg (< 30mmHg) PULMONIC VALVE:                      Normal Ranges: PV Max [place]: 0.7 m/s  (0.6-0.9m/s) PV Max P.1 mmHg  [id number] Brandon Ellis MD Electronically signed on 2024 at 3:38:08 PM  ** Final **      No nuclear medicine results found for the past 12 months.  Last nuclear stress test was in  which was normal with a normal EF    EKG:   SB with first degree av block     Radiology:     No orders to display     ASSESSMENT     Problem List Items Addressed This Visit       Abnormal computerized axial tomography of chest - Primary    Relevant Orders    ECG 12 lead (Clinic Performed) (Completed)    Elevated coronary artery calcium score    Relevant Orders    ECG 12 lead (Clinic Performed) (Completed)    Hypertension    Hypercholesterolemia    Premature atrial contraction    Atrial flutter (Multi)    Chronic anticoagulation       PLAN     1.  Elevated coronary artery calcium score.  Patient has underlying plaque but has never had a cardiac event.  He did have a nuclear stress test that was low risk and he has no symptoms of angina pectoris.  Will continue with risk factor modification conservative medical therapy.  2.  Atrial flutter with history of frequent premature atrial beats.  Some question about hospital EKG of atrial fibrillation in the past.  The patient when seen by EP had classic atrial flutter and was cardioverted after he been anticoagulated 2 years ago.  Plans were if he develops recurrent atrial flutter for a flutter ablation.  He remains on anticoagulation for  cardioembolic protection because of his chads score of 4.  He said no bleeding complications and his CBC recently was fine would recheck in 6 months.  3.  Hypertension.  Blood pressures are borderline would follow him closely would consider altering his medications by increasing his hydrochlorothiazide portion if his diastolic remains elevated.  4.  Transient hypoxia and history of COPD.  Pulmonary examination today is abnormal and they tell me that when he was seen recently by his pulmonologist they she felt the same.  We rechecked his saturations before he left the office and then they had recovered to 97%.  We have encouraged him to communicate with his pulmonologist what is going on.  They did prescribe a nebulizer and that would certainly benefit the patient.  5.  Hypercholesterolemia.  Patient is on statin therapy lipid panel shows LDL cholesterol was at goal.    Will see the patient in follow-up in 6 months as long as he is stable we can see him sooner at any time if there is any questions or concerns

## 2025-01-09 NOTE — PATIENT INSTRUCTIONS
Continue same medications  Get CBC - blood count - in 6 months prior to follow-up visit.   Return in 6 months.

## 2025-01-13 ENCOUNTER — APPOINTMENT (OUTPATIENT)
Dept: PRIMARY CARE | Facility: CLINIC | Age: 80
End: 2025-01-13
Payer: MEDICARE

## 2025-01-13 VITALS
BODY MASS INDEX: 28.95 KG/M2 | HEIGHT: 71 IN | HEART RATE: 65 BPM | WEIGHT: 206.8 LBS | DIASTOLIC BLOOD PRESSURE: 62 MMHG | OXYGEN SATURATION: 91 % | SYSTOLIC BLOOD PRESSURE: 122 MMHG

## 2025-01-13 DIAGNOSIS — I10 PRIMARY HYPERTENSION: Primary | ICD-10-CM

## 2025-01-13 DIAGNOSIS — E78.5 HYPERLIPIDEMIA, UNSPECIFIED: ICD-10-CM

## 2025-01-13 DIAGNOSIS — E78.2 MIXED HYPERLIPIDEMIA: ICD-10-CM

## 2025-01-13 DIAGNOSIS — E79.0 HYPERURICEMIA: ICD-10-CM

## 2025-01-13 DIAGNOSIS — Z86.79 HISTORY OF ATRIAL FLUTTER: ICD-10-CM

## 2025-01-13 DIAGNOSIS — E55.9 VITAMIN D DEFICIENCY: ICD-10-CM

## 2025-01-13 DIAGNOSIS — Z53.20 LUNG CANCER SCREENING DECLINED BY PATIENT: ICD-10-CM

## 2025-01-13 DIAGNOSIS — F17.200 CURRENT SMOKER: ICD-10-CM

## 2025-01-13 DIAGNOSIS — R73.9 HYPERGLYCEMIA: ICD-10-CM

## 2025-01-13 PROCEDURE — 3074F SYST BP LT 130 MM HG: CPT | Performed by: INTERNAL MEDICINE

## 2025-01-13 PROCEDURE — 1123F ACP DISCUSS/DSCN MKR DOCD: CPT | Performed by: INTERNAL MEDICINE

## 2025-01-13 PROCEDURE — 1159F MED LIST DOCD IN RCRD: CPT | Performed by: INTERNAL MEDICINE

## 2025-01-13 PROCEDURE — 99214 OFFICE O/P EST MOD 30 MIN: CPT | Performed by: INTERNAL MEDICINE

## 2025-01-13 PROCEDURE — 3078F DIAST BP <80 MM HG: CPT | Performed by: INTERNAL MEDICINE

## 2025-01-13 PROCEDURE — G2211 COMPLEX E/M VISIT ADD ON: HCPCS | Performed by: INTERNAL MEDICINE

## 2025-01-13 RX ORDER — SIMVASTATIN 40 MG/1
40 TABLET, FILM COATED ORAL NIGHTLY
Qty: 90 TABLET | Refills: 1 | Status: SHIPPED | OUTPATIENT
Start: 2025-01-13

## 2025-01-13 RX ORDER — BENAZEPRIL HYDROCHLORIDE AND HYDROCHLOROTHIAZIDE 10; 12.5 MG/1; MG/1
1 TABLET ORAL DAILY
Qty: 90 TABLET | Refills: 1 | Status: SHIPPED | OUTPATIENT
Start: 2025-01-13

## 2025-01-13 ASSESSMENT — PATIENT HEALTH QUESTIONNAIRE - PHQ9
1. LITTLE INTEREST OR PLEASURE IN DOING THINGS: NOT AT ALL
SUM OF ALL RESPONSES TO PHQ9 QUESTIONS 1 & 2: 0
2. FEELING DOWN, DEPRESSED OR HOPELESS: NOT AT ALL

## 2025-01-13 ASSESSMENT — ENCOUNTER SYMPTOMS
OCCASIONAL FEELINGS OF UNSTEADINESS: 0
DEPRESSION: 0
LOSS OF SENSATION IN FEET: 0

## 2025-01-13 NOTE — PROGRESS NOTES
Internal Medicine Outpatient Visit  Chief Complaint   Patient presents with    Follow-up     Follow up, med refill        HPI: Zechariah Christine is of 79 y.o. who is here for Internal Visit for the following issues:  Patient is seen in the office today for follow-up of his medical problems including hypertension, hyperlipidemia, smoking, hyperuricemia and other medical problems.  He also needs a refill on some of his prescriptions.  Denies any fever chill Anexsia.  Has no chest pain or palpitation.  Has occasional wheezing from COPD.  Patient is being followed by pulmonologist in [place].  He denies any numbness tingling weakness extremities.  He has no polyuria, polydipsia any other symptom uncontrolled hyperglycemia.  Review of other systems are negative.    Past Surgical History:   Procedure Laterality Date    OTHER SURGICAL HISTORY  02/07/2022    Colonoscopy    OTHER SURGICAL HISTORY  09/02/2022    Prostate surgery       Family History   Problem Relation Name Age of Onset    Alzheimer's disease Mother      Parkinsonism Father           Current Outpatient Medications on File Prior to Visit   Medication Sig Dispense Refill    albuterol 90 mcg/actuation inhaler Inhale 2 puffs every 6 hours if needed for wheezing. 18 g 1    allopurinol (Zyloprim) 100 mg tablet Take 1 tablet (100 mg) by mouth once daily. 90 tablet 1    doxycycline (Monodox) 100 mg capsule Take 1 capsule (100 mg) by mouth twice a day.      Eliquis 5 mg tablet Take 1 tablet (5 mg) by mouth 2 times a day. 180 tablet 3    ergocalciferol (Vitamin D-2) 1.25 MG (11810 UT) capsule Take 1 capsule (1,250 mcg) by mouth 1 (one) time per week. 13 capsule 1    ipratropium-albuteroL (Duo-Neb) 0.5-2.5 mg/3 mL nebulizer solution Inhale 3 mL every 6 hours if needed.      lutein 10 mg tablet Take 10 mg by mouth once daily.      metoprolol succinate XL (Toprol-XL) 25 mg 24 hr tablet TAKE 1 TABLET BY MOUTH ONCE DAILY 90 tablet 3    predniSONE (Deltasone) 20 mg tablet Take 2  "tablets (40 mg) by mouth once daily.      pregabalin (Lyrica) 50 mg capsule Take 1 capsule (50 mg) by mouth 3 times a day. (Patient taking differently: Take 1 capsule (50 mg) by mouth 2 times a day.)      Symbicort 160-4.5 mcg/actuation inhaler Inhale 2 puffs 2 times a day. 10.2 g 3    vit A/vit C/vit E/zinc/copper (PRESERVISION AREDS ORAL) Take 600 mg by mouth once daily.      [DISCONTINUED] benazepriL-hydrochlorothiazide (Lotensin HCT) 10-12.5 mg tablet Take 1 tablet by mouth once daily. 90 tablet 1    [DISCONTINUED] simvastatin (Zocor) 40 mg tablet Take 1 tablet (40 mg) by mouth once daily at bedtime. 90 tablet 1    [DISCONTINUED] valACYclovir (Valtrex) 1 gram tablet Take 1 tablet (1,000 mg) by mouth early in the morning.. (Patient not taking: Reported on 1/9/2025)       No current facility-administered medications on file prior to visit.       Blood pressure 122/62, pulse 65, height 1.803 m (5' 11\"), weight 93.8 kg (206 lb 12.8 oz), SpO2 91%.  Body mass index is 28.84 kg/m².  General examination: Overweight  Eyes: There is no pallor or jaundice external ocular movements are normal  Oral cavity throat normal  Neck: There is no JVD or thyroid enlargement  Lungs: Clear to auscultation  CVS: Heart sounds are regular.  There is no murmur  Abdomen: Soft there is no tenderness  CNS: Normal power  Legs: No edema    Assessment and plan:    1. Primary hypertension (Primary)  Controlled.  Prescription for benazepril-hydrochlorothiazide is renewed.  - benazepriL-hydrochlorothiazide (Lotensin HCT) 10-12.5 mg tablet; Take 1 tablet by mouth once daily.  Dispense: 90 tablet; Refill: 1    2. Mixed hyperlipidemia  Prescription for simvastatin is renewed.  Repeat lipid panel is ordered    3. Hyperglycemia  Last hemoglobin A1c was 5.6    4. Vitamin D deficiency  He is continued on the current supplement.    5.  History of more than 25-pack-year smoking /current smoker  Patient declined any CT scan for lung cancer screening.  He " also informs that he is being followed by his pulmonologist in [place]    6. History of atrial flutter  Patient is anticoagulated.  He is being followed by his cardiologist    7. BMI 28.0-28.9,adult  Diet excise activity reviewed with the patient.    8.  Health maintenance  He had a colonoscopy done in 2020.  He goes to urologist Dr. Peguero for prostate checkup.  He will be seen in office in 4 months.

## 2025-02-10 ENCOUNTER — OFFICE VISIT (OUTPATIENT)
Dept: PRIMARY CARE | Facility: CLINIC | Age: 80
End: 2025-02-10
Payer: MEDICARE

## 2025-02-10 VITALS
HEART RATE: 51 BPM | WEIGHT: 205 LBS | OXYGEN SATURATION: 89 % | BODY MASS INDEX: 28.7 KG/M2 | SYSTOLIC BLOOD PRESSURE: 160 MMHG | DIASTOLIC BLOOD PRESSURE: 70 MMHG | HEIGHT: 71 IN

## 2025-02-10 DIAGNOSIS — I99.8 FLUCTUATING BLOOD PRESSURE: Primary | ICD-10-CM

## 2025-02-10 DIAGNOSIS — R73.9 HYPERGLYCEMIA: ICD-10-CM

## 2025-02-10 DIAGNOSIS — E78.2 MIXED HYPERLIPIDEMIA: ICD-10-CM

## 2025-02-10 DIAGNOSIS — I10 PRIMARY HYPERTENSION: ICD-10-CM

## 2025-02-10 PROCEDURE — 3077F SYST BP >= 140 MM HG: CPT | Performed by: INTERNAL MEDICINE

## 2025-02-10 PROCEDURE — 1159F MED LIST DOCD IN RCRD: CPT | Performed by: INTERNAL MEDICINE

## 2025-02-10 PROCEDURE — 1123F ACP DISCUSS/DSCN MKR DOCD: CPT | Performed by: INTERNAL MEDICINE

## 2025-02-10 PROCEDURE — 3078F DIAST BP <80 MM HG: CPT | Performed by: INTERNAL MEDICINE

## 2025-02-10 PROCEDURE — 99213 OFFICE O/P EST LOW 20 MIN: CPT | Performed by: INTERNAL MEDICINE

## 2025-02-10 ASSESSMENT — ENCOUNTER SYMPTOMS
DEPRESSION: 0
LOSS OF SENSATION IN FEET: 0
OCCASIONAL FEELINGS OF UNSTEADINESS: 0

## 2025-02-10 NOTE — PROGRESS NOTES
Internal Medicine Outpatient Visit  Chief Complaint   Patient presents with    Hypertension     BP issues, bp has been up and down for 2 months.       HPI: Zechariah Christine is of 79 y.o. who is here for Internal Visit for the following issues:  Patient is seen office today with elevated blood pressure readings he says that most of the readings at home are systolic between 160 and 170.  However most of the readings in our office has been normal except today today the systolic blood pressure is 160.  He denies any blurring of vision or headache.  Has no chest pain or palpitation.  Has no shortness of breath or wheezing.  He has not noticed any swelling of the leg.  He has no urinary symptoms.  Review of other systems are negative.    Past Surgical History:   Procedure Laterality Date    OTHER SURGICAL HISTORY  02/07/2022    Colonoscopy    OTHER SURGICAL HISTORY  09/02/2022    Prostate surgery       Family History   Problem Relation Name Age of Onset    Alzheimer's disease Mother      Parkinsonism Father           Current Outpatient Medications on File Prior to Visit   Medication Sig Dispense Refill    albuterol 90 mcg/actuation inhaler Inhale 2 puffs every 6 hours if needed for wheezing. 18 g 1    allopurinol (Zyloprim) 100 mg tablet Take 1 tablet (100 mg) by mouth once daily. 90 tablet 1    benazepriL-hydrochlorothiazide (Lotensin HCT) 10-12.5 mg tablet Take 1 tablet by mouth once daily. 90 tablet 1    doxycycline (Monodox) 100 mg capsule Take 1 capsule (100 mg) by mouth twice a day.      Eliquis 5 mg tablet Take 1 tablet (5 mg) by mouth 2 times a day. 180 tablet 3    ergocalciferol (Vitamin D-2) 1.25 MG (52782 UT) capsule Take 1 capsule (1,250 mcg) by mouth 1 (one) time per week. 13 capsule 1    ipratropium-albuteroL (Duo-Neb) 0.5-2.5 mg/3 mL nebulizer solution Inhale 3 mL every 6 hours if needed.      lutein 10 mg tablet Take 10 mg by mouth once daily.      metoprolol succinate XL (Toprol-XL) 25 mg 24 hr tablet TAKE 1  "TABLET BY MOUTH ONCE DAILY 90 tablet 3    predniSONE (Deltasone) 20 mg tablet Take 2 tablets (40 mg) by mouth once daily.      simvastatin (Zocor) 40 mg tablet Take 1 tablet (40 mg) by mouth once daily at bedtime. 90 tablet 1    Symbicort 160-4.5 mcg/actuation inhaler Inhale 2 puffs 2 times a day. 10.2 g 3    vit A/vit C/vit E/zinc/copper (PRESERVISION AREDS ORAL) Take 600 mg by mouth once daily.      pregabalin (Lyrica) 50 mg capsule Take 1 capsule (50 mg) by mouth 3 times a day. (Patient taking differently: Take 1 capsule (50 mg) by mouth 2 times a day.)       No current facility-administered medications on file prior to visit.       Blood pressure 160/70, pulse 51, height 1.803 m (5' 11\"), weight 93 kg (205 lb), SpO2 (!) 89%.  Body mass index is 28.59 kg/m².  General examination: There is no acute discomfort  Eyes: There is no pallor or jaundice external ocular movements are normal  Neck: There is no JVD  Lungs: Clear to auscultation  CVS: Heart sounds are regular there is no gallop murmur  Legs: No edema    Assessment and plan:    1. Fluctuating blood pressure (Primary)  Patient is advised to check his blood pressure morning evening and write it down for about a week and call my office with the readings we will decide whether he needs any change in the treatment.    2. Primary hypertension  No changes in the treatment made today because most of the readings in our office has been normal lifestyle modifications are discussed with the patient including cutting down the salt intake    3. Mixed hyperlipidemia  Continue simvastatin    4. Hyperglycemia  Recent blood sugar levels are acceptable.  His follow-up appointment will depend on the reading of his blood pressure at home.                    "

## 2025-02-28 ENCOUNTER — APPOINTMENT (OUTPATIENT)
Dept: PRIMARY CARE | Facility: CLINIC | Age: 80
End: 2025-02-28
Payer: MEDICARE

## 2025-05-12 ENCOUNTER — APPOINTMENT (OUTPATIENT)
Dept: PRIMARY CARE | Facility: CLINIC | Age: 80
End: 2025-05-12
Payer: MEDICARE

## 2025-07-15 ENCOUNTER — TELEPHONE (OUTPATIENT)
Dept: CARDIOLOGY | Facility: HOSPITAL | Age: 80
End: 2025-07-15
Payer: MEDICARE

## 2025-07-15 NOTE — TELEPHONE ENCOUNTER
Pt friend Andria called to schedule follow up with Dr. Rios - pt last seen Nov 2023 at [facility] and would like to reestab w/ Dr. Rios for EP care. Agreeable to schedule next avail 8/21 9:30am at [facility]. Address provided.

## 2025-07-17 ENCOUNTER — APPOINTMENT (OUTPATIENT)
Dept: CARDIOLOGY | Facility: CLINIC | Age: 80
End: 2025-07-17
Payer: MEDICARE

## 2025-08-21 ENCOUNTER — APPOINTMENT (OUTPATIENT)
Dept: CARDIOLOGY | Facility: CLINIC | Age: 80
End: 2025-08-21
Payer: MEDICARE

## 2025-08-21 ENCOUNTER — TELEPHONE (OUTPATIENT)
Dept: CARDIOLOGY | Facility: CLINIC | Age: 80
End: 2025-08-21

## 2025-08-25 ENCOUNTER — TELEPHONE (OUTPATIENT)
Dept: CARDIOLOGY | Facility: HOSPITAL | Age: 80
End: 2025-08-25
Payer: MEDICARE

## 2025-08-25 DIAGNOSIS — I10 PRIMARY HYPERTENSION: ICD-10-CM

## 2025-08-26 RX ORDER — BENAZEPRIL HYDROCHLORIDE AND HYDROCHLOROTHIAZIDE 10; 12.5 MG/1; MG/1
1 TABLET ORAL DAILY
Qty: 90 TABLET | Refills: 1 | Status: SHIPPED | OUTPATIENT
Start: 2025-08-26

## 2025-08-28 ENCOUNTER — APPOINTMENT (OUTPATIENT)
Dept: CARDIOLOGY | Facility: CLINIC | Age: 80
End: 2025-08-28
Payer: MEDICARE

## 2025-09-11 ENCOUNTER — APPOINTMENT (OUTPATIENT)
Dept: CARDIOLOGY | Facility: CLINIC | Age: 80
End: 2025-09-11
Payer: MEDICARE

## 2025-10-07 ENCOUNTER — APPOINTMENT (OUTPATIENT)
Dept: PRIMARY CARE | Facility: CLINIC | Age: 80
End: 2025-10-07
Payer: MEDICARE